# Patient Record
Sex: FEMALE | Race: WHITE | NOT HISPANIC OR LATINO | Employment: OTHER | URBAN - METROPOLITAN AREA
[De-identification: names, ages, dates, MRNs, and addresses within clinical notes are randomized per-mention and may not be internally consistent; named-entity substitution may affect disease eponyms.]

---

## 2018-03-01 ENCOUNTER — CONVERSION ENCOUNTER (OUTPATIENT)
Dept: URGENT CARE | Facility: CLINIC | Age: 48
End: 2018-03-01

## 2018-07-29 ENCOUNTER — HOSPITAL ENCOUNTER (OUTPATIENT)
Dept: URGENT CARE | Facility: CLINIC | Age: 48
Discharge: HOME | End: 2018-07-29
Attending: FAMILY MEDICINE | Admitting: FAMILY MEDICINE

## 2018-07-29 ENCOUNTER — CONVERSION ENCOUNTER (OUTPATIENT)
Dept: URGENT CARE | Facility: CLINIC | Age: 48
End: 2018-07-29

## 2019-06-04 VITALS
SYSTOLIC BLOOD PRESSURE: 102 MMHG | DIASTOLIC BLOOD PRESSURE: 68 MMHG | WEIGHT: 128.2 LBS | RESPIRATION RATE: 20 BRPM | SYSTOLIC BLOOD PRESSURE: 119 MMHG | BODY MASS INDEX: 22.37 KG/M2 | HEIGHT: 66 IN | WEIGHT: 139.2 LBS | BODY MASS INDEX: 20.69 KG/M2 | HEART RATE: 76 BPM | OXYGEN SATURATION: 100 % | DIASTOLIC BLOOD PRESSURE: 80 MMHG | OXYGEN SATURATION: 100 % | HEART RATE: 84 BPM

## 2021-06-15 ENCOUNTER — APPOINTMENT (OUTPATIENT)
Dept: PAIN MEDICINE | Facility: CLINIC | Age: 51
End: 2021-06-15

## 2021-07-08 ENCOUNTER — OFFICE (AMBULATORY)
Dept: URBAN - METROPOLITAN AREA CLINIC 64 | Facility: CLINIC | Age: 51
End: 2021-07-08

## 2021-07-08 VITALS
HEART RATE: 83 BPM | HEIGHT: 66 IN | WEIGHT: 148 LBS | SYSTOLIC BLOOD PRESSURE: 106 MMHG | DIASTOLIC BLOOD PRESSURE: 61 MMHG

## 2021-07-08 DIAGNOSIS — Z91.018 ALLERGY TO OTHER FOODS: ICD-10-CM

## 2021-07-08 DIAGNOSIS — R13.10 DYSPHAGIA, UNSPECIFIED: ICD-10-CM

## 2021-07-08 DIAGNOSIS — R19.4 CHANGE IN BOWEL HABIT: ICD-10-CM

## 2021-07-08 PROCEDURE — 99203 OFFICE O/P NEW LOW 30 MIN: CPT | Performed by: INTERNAL MEDICINE

## 2021-08-04 ENCOUNTER — OFFICE (AMBULATORY)
Dept: URBAN - METROPOLITAN AREA PATHOLOGY 4 | Facility: PATHOLOGY | Age: 51
End: 2021-08-04

## 2021-08-04 ENCOUNTER — ON CAMPUS - OUTPATIENT (AMBULATORY)
Dept: URBAN - METROPOLITAN AREA HOSPITAL 2 | Facility: HOSPITAL | Age: 51
End: 2021-08-04

## 2021-08-04 VITALS
SYSTOLIC BLOOD PRESSURE: 133 MMHG | TEMPERATURE: 97.8 F | WEIGHT: 136 LBS | DIASTOLIC BLOOD PRESSURE: 75 MMHG | SYSTOLIC BLOOD PRESSURE: 144 MMHG | RESPIRATION RATE: 17 BRPM | SYSTOLIC BLOOD PRESSURE: 123 MMHG | RESPIRATION RATE: 16 BRPM | SYSTOLIC BLOOD PRESSURE: 129 MMHG | HEART RATE: 87 BPM | DIASTOLIC BLOOD PRESSURE: 77 MMHG | HEART RATE: 74 BPM | OXYGEN SATURATION: 98 % | OXYGEN SATURATION: 100 % | DIASTOLIC BLOOD PRESSURE: 78 MMHG | HEIGHT: 66 IN | DIASTOLIC BLOOD PRESSURE: 76 MMHG | HEART RATE: 83 BPM | OXYGEN SATURATION: 99 % | DIASTOLIC BLOOD PRESSURE: 71 MMHG | SYSTOLIC BLOOD PRESSURE: 118 MMHG | HEART RATE: 81 BPM | DIASTOLIC BLOOD PRESSURE: 83 MMHG | HEART RATE: 78 BPM | DIASTOLIC BLOOD PRESSURE: 73 MMHG | SYSTOLIC BLOOD PRESSURE: 141 MMHG | HEART RATE: 88 BPM | RESPIRATION RATE: 18 BRPM | SYSTOLIC BLOOD PRESSURE: 126 MMHG | DIASTOLIC BLOOD PRESSURE: 88 MMHG | HEART RATE: 92 BPM | SYSTOLIC BLOOD PRESSURE: 113 MMHG | DIASTOLIC BLOOD PRESSURE: 81 MMHG

## 2021-08-04 DIAGNOSIS — K25.9 GASTRIC ULCER, UNSPECIFIED AS ACUTE OR CHRONIC, WITHOUT HEMO: ICD-10-CM

## 2021-08-04 DIAGNOSIS — K22.2 ESOPHAGEAL OBSTRUCTION: ICD-10-CM

## 2021-08-04 DIAGNOSIS — K31.89 OTHER DISEASES OF STOMACH AND DUODENUM: ICD-10-CM

## 2021-08-04 DIAGNOSIS — K20.0 EOSINOPHILIC ESOPHAGITIS: ICD-10-CM

## 2021-08-04 DIAGNOSIS — R13.10 DYSPHAGIA, UNSPECIFIED: ICD-10-CM

## 2021-08-04 DIAGNOSIS — K25.7 CHRONIC GASTRIC ULCER WITHOUT HEMORRHAGE OR PERFORATION: ICD-10-CM

## 2021-08-04 DIAGNOSIS — Z91.018 ALLERGY TO OTHER FOODS: ICD-10-CM

## 2021-08-04 DIAGNOSIS — R19.4 CHANGE IN BOWEL HABIT: ICD-10-CM

## 2021-08-04 PROBLEM — K22.8 OTHER SPECIFIED DISEASES OF ESOPHAGUS: Status: ACTIVE | Noted: 2021-08-04

## 2021-08-04 LAB
GI HISTOLOGY: A. SELECT: (no result)
GI HISTOLOGY: B. UNSPECIFIED: (no result)
GI HISTOLOGY: C. SELECT: (no result)
GI HISTOLOGY: PDF REPORT: (no result)

## 2021-08-04 PROCEDURE — 43239 EGD BIOPSY SINGLE/MULTIPLE: CPT | Performed by: INTERNAL MEDICINE

## 2021-08-04 PROCEDURE — 88342 IMHCHEM/IMCYTCHM 1ST ANTB: CPT | Mod: 26 | Performed by: INTERNAL MEDICINE

## 2021-08-04 PROCEDURE — 88305 TISSUE EXAM BY PATHOLOGIST: CPT | Mod: 26 | Performed by: INTERNAL MEDICINE

## 2021-08-04 PROCEDURE — 43450 DILATE ESOPHAGUS 1/MULT PASS: CPT | Performed by: INTERNAL MEDICINE

## 2021-08-04 PROCEDURE — 45378 DIAGNOSTIC COLONOSCOPY: CPT | Performed by: INTERNAL MEDICINE

## 2021-08-04 RX ORDER — PANTOPRAZOLE SODIUM 40 MG/1
TABLET, DELAYED RELEASE ORAL
Qty: 90 | Refills: 0 | Status: COMPLETED
End: 2023-10-26

## 2021-08-04 RX ORDER — MELOXICAM 15 MG/1
TABLET ORAL
Refills: 0 | Status: COMPLETED
End: 2021-08-04

## 2021-08-04 RX ORDER — ACETAMINOPHEN, ASPIRIN (NSAID) AND CAFFEINE 250; 250; 65 MG/1; MG/1; MG/1
TABLET, FILM COATED ORAL
Qty: 0 | Refills: 0 | Status: COMPLETED
End: 2021-08-04

## 2021-09-24 ENCOUNTER — OFFICE (AMBULATORY)
Dept: URBAN - METROPOLITAN AREA CLINIC 64 | Facility: CLINIC | Age: 51
End: 2021-09-24

## 2021-09-24 VITALS
SYSTOLIC BLOOD PRESSURE: 123 MMHG | HEART RATE: 86 BPM | DIASTOLIC BLOOD PRESSURE: 83 MMHG | HEIGHT: 66 IN | WEIGHT: 141 LBS

## 2021-09-24 DIAGNOSIS — K25.7 CHRONIC GASTRIC ULCER WITHOUT HEMORRHAGE OR PERFORATION: ICD-10-CM

## 2021-09-24 DIAGNOSIS — K59.00 CONSTIPATION, UNSPECIFIED: ICD-10-CM

## 2021-09-24 DIAGNOSIS — R13.10 DYSPHAGIA, UNSPECIFIED: ICD-10-CM

## 2021-09-24 DIAGNOSIS — Z91.018 ALLERGY TO OTHER FOODS: ICD-10-CM

## 2021-09-24 DIAGNOSIS — K20.0 EOSINOPHILIC ESOPHAGITIS: ICD-10-CM

## 2021-09-24 PROCEDURE — 99214 OFFICE O/P EST MOD 30 MIN: CPT | Performed by: INTERNAL MEDICINE

## 2021-09-24 RX ORDER — FLUTICASONE PROPIONATE 220 UG/1
AEROSOL, METERED RESPIRATORY (INHALATION)
Qty: 1 | Refills: 3 | Status: ACTIVE
Start: 2021-09-24

## 2021-09-28 ENCOUNTER — TREATMENT (OUTPATIENT)
Dept: PHYSICAL THERAPY | Facility: CLINIC | Age: 51
End: 2021-09-28

## 2021-09-28 DIAGNOSIS — S93.401A SPRAIN OF RIGHT ANKLE, UNSPECIFIED LIGAMENT, INITIAL ENCOUNTER: ICD-10-CM

## 2021-09-28 DIAGNOSIS — S73.101A SPRAIN OF RIGHT HIP, INITIAL ENCOUNTER: ICD-10-CM

## 2021-09-28 DIAGNOSIS — S33.5XXA LUMBAR SPRAIN, INITIAL ENCOUNTER: Primary | ICD-10-CM

## 2021-09-28 DIAGNOSIS — S83.91XA SPRAIN OF RIGHT KNEE, UNSPECIFIED LIGAMENT, INITIAL ENCOUNTER: ICD-10-CM
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
## 2021-09-28 PROCEDURE — 97162 PT EVAL MOD COMPLEX 30 MIN: CPT | Performed by: PHYSICAL THERAPIST

## 2021-09-28 PROCEDURE — 97110 THERAPEUTIC EXERCISES: CPT | Performed by: PHYSICAL THERAPIST
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
## 2021-09-30 ENCOUNTER — TREATMENT (OUTPATIENT)
Dept: PHYSICAL THERAPY | Facility: CLINIC | Age: 51
End: 2021-09-30
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
## 2021-09-30 DIAGNOSIS — S93.401A SPRAIN OF RIGHT ANKLE, UNSPECIFIED LIGAMENT, INITIAL ENCOUNTER: ICD-10-CM

## 2021-09-30 DIAGNOSIS — S33.5XXA LUMBAR SPRAIN, INITIAL ENCOUNTER: Primary | ICD-10-CM

## 2021-09-30 DIAGNOSIS — S73.101A SPRAIN OF RIGHT HIP, INITIAL ENCOUNTER: ICD-10-CM

## 2021-09-30 DIAGNOSIS — S83.91XA SPRAIN OF RIGHT KNEE, UNSPECIFIED LIGAMENT, INITIAL ENCOUNTER: ICD-10-CM

## 2021-09-30 PROCEDURE — 97113 AQUATIC THERAPY/EXERCISES: CPT | Performed by: PHYSICAL THERAPIST
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
## 2021-10-07 ENCOUNTER — TREATMENT (OUTPATIENT)
Dept: PHYSICAL THERAPY | Facility: CLINIC | Age: 51
End: 2021-10-07

## 2021-10-07 DIAGNOSIS — S93.401A SPRAIN OF RIGHT ANKLE, UNSPECIFIED LIGAMENT, INITIAL ENCOUNTER: ICD-10-CM

## 2021-10-07 DIAGNOSIS — S33.5XXA LUMBAR SPRAIN, INITIAL ENCOUNTER: Primary | ICD-10-CM

## 2021-10-07 DIAGNOSIS — S83.91XA SPRAIN OF RIGHT KNEE, UNSPECIFIED LIGAMENT, INITIAL ENCOUNTER: ICD-10-CM

## 2021-10-07 DIAGNOSIS — S73.101A SPRAIN OF RIGHT HIP, INITIAL ENCOUNTER: ICD-10-CM

## 2021-10-07 PROCEDURE — 97113 AQUATIC THERAPY/EXERCISES: CPT | Performed by: PHYSICAL THERAPIST

## 2021-10-07 NOTE — PROGRESS NOTES
Physical Therapy Daily Progress Note  VISIT#: 3 POC 12   Diagnosis Plan   1. Lumbar sprain, initial encounter     2. Sprain of right hip, initial encounter     3. Sprain of right knee, unspecified ligament, initial encounter     4. Sprain of right ankle, unspecified ligament, initial encounter       Subjective     Archana Cisneros reports: just got up.  R LBP, R medial knee and back of R heel Achilles tendon sore 6/10. Was more sore after last PT session, later that day. Was surprised how sore she got. She did not feel she did that much.       Objective       See Exercise, Manual, and Modality Logs for complete treatment.     Patient Education: [employer] ex program  Rest and re-hydrate after pool: repeated.    In/out with steps.      Assessment/Plan    C/o increased Low back  & R ankle worse after session.   C/o still having trouble rolling over in bed.    STG's (2 - 4 weeks)  1. Tolerate 45 minutes aquatic therapy with reduction in pain - making progress  2. Able to ambulate x 10 min on Aquatic TM with improved gait pattern - making progress.  3. Reports improved ability to walk through the grocery store.  4. Decreased swelling in R lateral ankle and knee.    LTG's (by d/c)  1. Independent with HEP and able to manage condition independently.  2. Patient  has been able to wean out of R ankle brace.  3. Decrease overall pain 70% or more   4. Significant improvement on outcome measure.    Progress per Plan of Care and Progress strengthening /stabilization /functional activity  When able to tolerate.          Timed:         Manual Therapy:         mins  66677;     Therapeutic Exercise:         mins  27758;     Neuromuscular Funmi:        mins  99761;    Therapeutic Activity:          mins  36809;     Gait Training:           mins  25216;     Ultrasound:          mins  45388;    Ionto                                   mins   30840  Self Care                            mins   58998  [facility]                   mins   4209  Aquatic                          45     mins 08755    Un-Timed:  Electrical Stimulation:         mins  53578 ( );  Dry Needling          mins self-pay  Traction          mins 11912    Timed Treatment:   45   mins   Total Treatment:     45   mins    Eleni Francis PT  [place] License # [id number]  Physical Therapist

## 2021-10-11 ENCOUNTER — TREATMENT (OUTPATIENT)
Dept: PHYSICAL THERAPY | Facility: CLINIC | Age: 51
End: 2021-10-11

## 2021-10-11 DIAGNOSIS — S73.101A SPRAIN OF RIGHT HIP, INITIAL ENCOUNTER: ICD-10-CM

## 2021-10-11 DIAGNOSIS — S93.401A SPRAIN OF RIGHT ANKLE, UNSPECIFIED LIGAMENT, INITIAL ENCOUNTER: ICD-10-CM
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
## 2021-10-11 DIAGNOSIS — S83.91XA SPRAIN OF RIGHT KNEE, UNSPECIFIED LIGAMENT, INITIAL ENCOUNTER: ICD-10-CM

## 2021-10-11 DIAGNOSIS — S33.5XXA LUMBAR SPRAIN, INITIAL ENCOUNTER: Primary | ICD-10-CM

## 2021-10-11 PROCEDURE — 97113 AQUATIC THERAPY/EXERCISES: CPT | Performed by: PHYSICAL THERAPIST
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
## 2021-10-20 ENCOUNTER — TREATMENT (OUTPATIENT)
Dept: PHYSICAL THERAPY | Facility: CLINIC | Age: 51
End: 2021-10-20

## 2021-10-20 DIAGNOSIS — S83.91XA SPRAIN OF RIGHT KNEE, UNSPECIFIED LIGAMENT, INITIAL ENCOUNTER: ICD-10-CM

## 2021-10-20 DIAGNOSIS — S73.101A SPRAIN OF RIGHT HIP, INITIAL ENCOUNTER: ICD-10-CM

## 2021-10-20 DIAGNOSIS — S33.5XXA LUMBAR SPRAIN, INITIAL ENCOUNTER: Primary | ICD-10-CM

## 2021-10-20 DIAGNOSIS — S93.401A SPRAIN OF RIGHT ANKLE, UNSPECIFIED LIGAMENT, INITIAL ENCOUNTER: ICD-10-CM

## 2021-10-20 PROCEDURE — 97113 AQUATIC THERAPY/EXERCISES: CPT | Performed by: PHYSICAL THERAPIST

## 2021-10-20 NOTE — PROGRESS NOTES
Physical Therapy Daily Progress Note  VISIT#: 5 POC 12   Diagnosis Plan   1. Lumbar sprain, initial encounter     2. Sprain of right hip, initial encounter     3. Sprain of right knee, unspecified ligament, initial encounter     4. Sprain of right ankle, unspecified ligament, initial encounter       Subjective     Archana Cisneros reports: R LBP 6/10, R  Ankle pain 8/10. Last night Ankle was killing her. Used Epsom salt in warm water, it helped a little.     Objective       See Exercise, Manual, and Modality Logs for complete treatment.     Patient Education: [employer] ex program  Discussed: stressed due to work on her home.  PT demo how to do morning stretches in am: start with arms, then legs followed by sides and all 4's stretch.      LBP sharper during hang to stand from suspension.  Still mainly R back above heel pain and not normal sensation bottom of R medial foot.   EIS back over rail worked better.  Felt better to do Ham curls while putting pressure over R SI during.    Pace on the TM still aggravating foot, going back hurt R LB more during.    Did noodle suspension during jets  In the beginning and at the end    Did rinse off after pool.    In/out with steps.      Assessment/Plan  Still no significant progress.  But felt better during hang time in the jets.  Discussed trying lumbar brace and trial of Tape to ankle.  Did after pool. Both seemed to help. Left with R ankle tape on.      STG's (2 - 4 weeks)  1. Tolerate 45 minutes aquatic therapy with reduction in pain - making progress  2. Able to ambulate x 10 min on Aquatic TM with improved gait pattern - making progress.  3. Reports improved ability to walk through the grocery store - not yet  4. Decreased swelling in R lateral ankle and knee - still swelling mainly in lateral R ankle.    LTG's (by d/c)  1. Independent with HEP and able to manage condition independently.  2. Patient  has been able to wean out of R ankle brace.  3. Decrease overall pain 70% or  more   4. Significant improvement on outcome measure.    Progress per Plan of Care and Progress strengthening /stabilization /functional activity  When able to tolerate.          Timed:         Manual Therapy:         mins  82223;     Therapeutic Exercise:         mins  96018;     Neuromuscular Funmi:        mins  83991;    Therapeutic Activity:          mins  31692;     Gait Training:           mins  06826;     Ultrasound:          mins  43525;    Ionto                                   mins   09338  Self Care                            mins   18208  Canalith Repos                   mins  4209  Aquatic                          45     mins 36657    Un-Timed:  Electrical Stimulation:         mins  41067 ( );  Dry Needling          mins self-pay  Traction          mins 78658    Timed Treatment:   45   mins   Total Treatment:     45   mins    Eleni Francis PT  [place] License # [id number]  Physical Therapist

## 2021-10-22 ENCOUNTER — TREATMENT (OUTPATIENT)
Dept: PHYSICAL THERAPY | Facility: CLINIC | Age: 51
End: 2021-10-22

## 2021-10-22 DIAGNOSIS — S73.101A SPRAIN OF RIGHT HIP, INITIAL ENCOUNTER: ICD-10-CM

## 2021-10-22 DIAGNOSIS — S33.5XXA LUMBAR SPRAIN, INITIAL ENCOUNTER: Primary | ICD-10-CM
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
## 2021-10-22 DIAGNOSIS — S83.91XA SPRAIN OF RIGHT KNEE, UNSPECIFIED LIGAMENT, INITIAL ENCOUNTER: ICD-10-CM

## 2021-10-22 DIAGNOSIS — S93.401A SPRAIN OF RIGHT ANKLE, UNSPECIFIED LIGAMENT, INITIAL ENCOUNTER: ICD-10-CM

## 2021-10-22 PROCEDURE — 97113 AQUATIC THERAPY/EXERCISES: CPT | Performed by: PHYSICAL THERAPIST
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
## 2021-10-25 ENCOUNTER — TREATMENT (OUTPATIENT)
Dept: PHYSICAL THERAPY | Facility: CLINIC | Age: 51
End: 2021-10-25

## 2021-10-25 DIAGNOSIS — S73.101A SPRAIN OF RIGHT HIP, INITIAL ENCOUNTER: ICD-10-CM

## 2021-10-25 DIAGNOSIS — S93.401A SPRAIN OF RIGHT ANKLE, UNSPECIFIED LIGAMENT, INITIAL ENCOUNTER: ICD-10-CM

## 2021-10-25 DIAGNOSIS — S83.91XA SPRAIN OF RIGHT KNEE, UNSPECIFIED LIGAMENT, INITIAL ENCOUNTER: ICD-10-CM

## 2021-10-25 DIAGNOSIS — S33.5XXA LUMBAR SPRAIN, INITIAL ENCOUNTER: Primary | ICD-10-CM
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
## 2021-10-25 PROCEDURE — 97113 AQUATIC THERAPY/EXERCISES: CPT | Performed by: PHYSICAL THERAPIST
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
## 2021-10-27 ENCOUNTER — OFFICE (AMBULATORY)
Dept: URBAN - METROPOLITAN AREA PATHOLOGY 4 | Facility: PATHOLOGY | Age: 51
End: 2021-10-27

## 2021-10-27 ENCOUNTER — ON CAMPUS - OUTPATIENT (AMBULATORY)
Dept: URBAN - METROPOLITAN AREA HOSPITAL 2 | Facility: HOSPITAL | Age: 51
End: 2021-10-27

## 2021-10-27 VITALS
DIASTOLIC BLOOD PRESSURE: 74 MMHG | SYSTOLIC BLOOD PRESSURE: 106 MMHG | DIASTOLIC BLOOD PRESSURE: 70 MMHG | SYSTOLIC BLOOD PRESSURE: 120 MMHG | TEMPERATURE: 97.6 F | DIASTOLIC BLOOD PRESSURE: 81 MMHG | RESPIRATION RATE: 16 BRPM | SYSTOLIC BLOOD PRESSURE: 108 MMHG | WEIGHT: 142.4 LBS | HEART RATE: 76 BPM | HEART RATE: 88 BPM | RESPIRATION RATE: 18 BRPM | OXYGEN SATURATION: 98 % | HEART RATE: 75 BPM | SYSTOLIC BLOOD PRESSURE: 134 MMHG | HEART RATE: 70 BPM | HEIGHT: 66 IN | HEART RATE: 81 BPM | DIASTOLIC BLOOD PRESSURE: 92 MMHG | OXYGEN SATURATION: 100 % | DIASTOLIC BLOOD PRESSURE: 50 MMHG | RESPIRATION RATE: 17 BRPM | DIASTOLIC BLOOD PRESSURE: 68 MMHG | HEART RATE: 91 BPM | SYSTOLIC BLOOD PRESSURE: 113 MMHG | SYSTOLIC BLOOD PRESSURE: 114 MMHG

## 2021-10-27 DIAGNOSIS — K22.89 OTHER SPECIFIED DISEASE OF ESOPHAGUS: ICD-10-CM

## 2021-10-27 DIAGNOSIS — K22.2 ESOPHAGEAL OBSTRUCTION: ICD-10-CM

## 2021-10-27 DIAGNOSIS — K20.0 EOSINOPHILIC ESOPHAGITIS: ICD-10-CM

## 2021-10-27 DIAGNOSIS — R13.10 DYSPHAGIA, UNSPECIFIED: ICD-10-CM

## 2021-10-27 DIAGNOSIS — Z87.11 PERSONAL HISTORY OF PEPTIC ULCER DISEASE: ICD-10-CM

## 2021-10-27 LAB
GI HISTOLOGY: A. UNSPECIFIED: (no result)
GI HISTOLOGY: PDF REPORT: (no result)

## 2021-10-27 PROCEDURE — 43450 DILATE ESOPHAGUS 1/MULT PASS: CPT | Performed by: INTERNAL MEDICINE

## 2021-10-27 PROCEDURE — 43239 EGD BIOPSY SINGLE/MULTIPLE: CPT | Performed by: INTERNAL MEDICINE

## 2021-10-27 PROCEDURE — 88305 TISSUE EXAM BY PATHOLOGIST: CPT | Mod: 26,Q6 | Performed by: INTERNAL MEDICINE

## 2021-11-01 ENCOUNTER — TREATMENT (OUTPATIENT)
Dept: PHYSICAL THERAPY | Facility: CLINIC | Age: 51
End: 2021-11-01

## 2021-11-01 DIAGNOSIS — S93.401A SPRAIN OF RIGHT ANKLE, UNSPECIFIED LIGAMENT, INITIAL ENCOUNTER: ICD-10-CM
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
## 2021-11-01 DIAGNOSIS — S83.91XA SPRAIN OF RIGHT KNEE, UNSPECIFIED LIGAMENT, INITIAL ENCOUNTER: ICD-10-CM

## 2021-11-01 DIAGNOSIS — S73.101A SPRAIN OF RIGHT HIP, INITIAL ENCOUNTER: ICD-10-CM

## 2021-11-01 DIAGNOSIS — S33.5XXA LUMBAR SPRAIN, INITIAL ENCOUNTER: Primary | ICD-10-CM

## 2021-11-01 PROCEDURE — 97113 AQUATIC THERAPY/EXERCISES: CPT | Performed by: PHYSICAL THERAPIST
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
## 2021-11-04 ENCOUNTER — TREATMENT (OUTPATIENT)
Dept: PHYSICAL THERAPY | Facility: CLINIC | Age: 51
End: 2021-11-04

## 2021-11-04 DIAGNOSIS — S73.101A SPRAIN OF RIGHT HIP, INITIAL ENCOUNTER: ICD-10-CM

## 2021-11-04 DIAGNOSIS — S83.91XA SPRAIN OF RIGHT KNEE, UNSPECIFIED LIGAMENT, INITIAL ENCOUNTER: ICD-10-CM

## 2021-11-04 DIAGNOSIS — S93.401A SPRAIN OF RIGHT ANKLE, UNSPECIFIED LIGAMENT, INITIAL ENCOUNTER: ICD-10-CM

## 2021-11-04 DIAGNOSIS — S33.5XXA LUMBAR SPRAIN, INITIAL ENCOUNTER: Primary | ICD-10-CM
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
## 2021-11-04 PROCEDURE — 97113 AQUATIC THERAPY/EXERCISES: CPT | Performed by: PHYSICAL THERAPIST
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
## 2021-11-10 ENCOUNTER — TREATMENT (OUTPATIENT)
Dept: PHYSICAL THERAPY | Facility: CLINIC | Age: 51
End: 2021-11-10

## 2021-11-10 DIAGNOSIS — S73.101A SPRAIN OF RIGHT HIP, INITIAL ENCOUNTER: ICD-10-CM

## 2021-11-10 DIAGNOSIS — S83.91XA SPRAIN OF RIGHT KNEE, UNSPECIFIED LIGAMENT, INITIAL ENCOUNTER: ICD-10-CM

## 2021-11-10 DIAGNOSIS — S33.5XXA LUMBAR SPRAIN, INITIAL ENCOUNTER: Primary | ICD-10-CM

## 2021-11-10 DIAGNOSIS — S93.401A SPRAIN OF RIGHT ANKLE, UNSPECIFIED LIGAMENT, INITIAL ENCOUNTER: ICD-10-CM

## 2021-11-10 PROCEDURE — 97110 THERAPEUTIC EXERCISES: CPT | Performed by: PHYSICAL THERAPIST

## 2021-11-10 PROCEDURE — 97014 ELECTRIC STIMULATION THERAPY: CPT | Performed by: PHYSICAL THERAPIST
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
## 2021-11-15 ENCOUNTER — TREATMENT (OUTPATIENT)
Dept: PHYSICAL THERAPY | Facility: CLINIC | Age: 51
End: 2021-11-15

## 2021-11-15 DIAGNOSIS — S73.101A SPRAIN OF RIGHT HIP, INITIAL ENCOUNTER: ICD-10-CM

## 2021-11-15 DIAGNOSIS — S83.91XA SPRAIN OF RIGHT KNEE, UNSPECIFIED LIGAMENT, INITIAL ENCOUNTER: ICD-10-CM
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
## 2021-11-15 DIAGNOSIS — S93.401A SPRAIN OF RIGHT ANKLE, UNSPECIFIED LIGAMENT, INITIAL ENCOUNTER: ICD-10-CM

## 2021-11-15 DIAGNOSIS — S33.5XXA LUMBAR SPRAIN, INITIAL ENCOUNTER: Primary | ICD-10-CM

## 2021-11-15 PROCEDURE — 97110 THERAPEUTIC EXERCISES: CPT | Performed by: PHYSICAL THERAPIST

## 2021-11-15 PROCEDURE — 97140 MANUAL THERAPY 1/> REGIONS: CPT | Performed by: PHYSICAL THERAPIST

## 2021-11-15 PROCEDURE — 97014 ELECTRIC STIMULATION THERAPY: CPT | Performed by: PHYSICAL THERAPIST
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
## 2021-12-08 ENCOUNTER — DOCUMENTATION (OUTPATIENT)
Dept: PHYSICAL THERAPY | Facility: CLINIC | Age: 51
End: 2021-12-08

## 2021-12-08 NOTE — PROGRESS NOTES
Physical Therapy Discharge Summary          Patient: Archana Cisneros  : 1970  Diagnosis/ICD-10 Code: Lumbar sprain, R hip , knee and ankle pain.  Referring practitioner: Temo Velazquez MS.  Date of Initial Visit: 21  Today's Date: 2021  Patient seen for 11 sessions      Visit Diagnosis:  LBP, R hip, knee and ankle pain.    Discharge Status of Patient: See MD Note dated: 11/10/21    Goals: Partially Met    Discharge Plan: Continue with current home exercise program as instructed    Comments:    LM/VM FOR PT TO CALL BACK AND LET US KNOW IF SHE WOULD LIKE TO SCHEDULE OR BE D/C'D. IF WE DO NOT HEAR BACK FROM HER BY 12-3, WE WILL D/C' PT AT THAT TIME  Patient did not call back to schedule more.   If she wants to resume PT she will need a new MD order.    Discharge date: 21        Eleni Francis PT  [place] License # [id number]  Physical Therapist

## 2022-02-21 ENCOUNTER — OFFICE (AMBULATORY)
Dept: URBAN - METROPOLITAN AREA CLINIC 64 | Facility: CLINIC | Age: 52
End: 2022-02-21

## 2022-02-21 VITALS
DIASTOLIC BLOOD PRESSURE: 65 MMHG | SYSTOLIC BLOOD PRESSURE: 102 MMHG | WEIGHT: 145 LBS | HEART RATE: 89 BPM | HEIGHT: 66 IN

## 2022-02-21 DIAGNOSIS — K20.0 EOSINOPHILIC ESOPHAGITIS: ICD-10-CM

## 2022-02-21 DIAGNOSIS — K59.00 CONSTIPATION, UNSPECIFIED: ICD-10-CM

## 2022-02-21 DIAGNOSIS — R13.10 DYSPHAGIA, UNSPECIFIED: ICD-10-CM

## 2022-02-21 PROCEDURE — 99213 OFFICE O/P EST LOW 20 MIN: CPT | Performed by: NURSE PRACTITIONER

## 2022-05-04 ENCOUNTER — OFFICE (AMBULATORY)
Dept: URBAN - METROPOLITAN AREA CLINIC 64 | Facility: CLINIC | Age: 52
End: 2022-05-04

## 2022-05-04 VITALS
DIASTOLIC BLOOD PRESSURE: 92 MMHG | WEIGHT: 142 LBS | HEART RATE: 81 BPM | SYSTOLIC BLOOD PRESSURE: 132 MMHG | HEIGHT: 66 IN

## 2022-05-04 DIAGNOSIS — K20.0 EOSINOPHILIC ESOPHAGITIS: ICD-10-CM

## 2022-05-04 PROCEDURE — 99214 OFFICE O/P EST MOD 30 MIN: CPT | Performed by: NURSE PRACTITIONER

## 2022-10-20 ENCOUNTER — TRANSCRIBE ORDERS (OUTPATIENT)
Dept: PHYSICAL THERAPY | Facility: CLINIC | Age: 52
End: 2022-10-20

## 2022-10-20 DIAGNOSIS — M54.2 CERVICAL PAIN: Primary | ICD-10-CM

## 2022-11-21 ENCOUNTER — TREATMENT (OUTPATIENT)
Dept: PHYSICAL THERAPY | Facility: CLINIC | Age: 52
End: 2022-11-21

## 2022-11-21 ENCOUNTER — TRANSCRIBE ORDERS (OUTPATIENT)
Dept: PHYSICAL THERAPY | Facility: CLINIC | Age: 52
End: 2022-11-21

## 2022-11-21 DIAGNOSIS — M54.50 LOW BACK PAIN, UNSPECIFIED BACK PAIN LATERALITY, UNSPECIFIED CHRONICITY, UNSPECIFIED WHETHER SCIATICA PRESENT: Primary | ICD-10-CM

## 2022-11-21 DIAGNOSIS — M54.50 CHRONIC LOW BACK PAIN, UNSPECIFIED BACK PAIN LATERALITY, UNSPECIFIED WHETHER SCIATICA PRESENT: Primary | ICD-10-CM

## 2022-11-21 DIAGNOSIS — G89.29 CHRONIC LOW BACK PAIN, UNSPECIFIED BACK PAIN LATERALITY, UNSPECIFIED WHETHER SCIATICA PRESENT: Primary | ICD-10-CM

## 2022-11-21 PROCEDURE — 97162 PT EVAL MOD COMPLEX 30 MIN: CPT | Performed by: PHYSICAL THERAPIST

## 2022-11-21 PROCEDURE — 97110 THERAPEUTIC EXERCISES: CPT | Performed by: PHYSICAL THERAPIST
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
## 2022-12-12 ENCOUNTER — TREATMENT (OUTPATIENT)
Dept: PHYSICAL THERAPY | Facility: CLINIC | Age: 52
End: 2022-12-12

## 2022-12-12 DIAGNOSIS — M54.50 CHRONIC LOW BACK PAIN, UNSPECIFIED BACK PAIN LATERALITY, UNSPECIFIED WHETHER SCIATICA PRESENT: Primary | ICD-10-CM

## 2022-12-12 DIAGNOSIS — S33.5XXA LUMBAR SPRAIN, INITIAL ENCOUNTER: ICD-10-CM

## 2022-12-12 DIAGNOSIS — S73.101A SPRAIN OF RIGHT HIP, INITIAL ENCOUNTER: ICD-10-CM

## 2022-12-12 DIAGNOSIS — G89.29 CHRONIC LOW BACK PAIN, UNSPECIFIED BACK PAIN LATERALITY, UNSPECIFIED WHETHER SCIATICA PRESENT: Primary | ICD-10-CM

## 2022-12-12 PROCEDURE — 97112 NEUROMUSCULAR REEDUCATION: CPT | Performed by: PHYSICAL THERAPIST

## 2022-12-12 PROCEDURE — 97110 THERAPEUTIC EXERCISES: CPT | Performed by: PHYSICAL THERAPIST

## 2022-12-12 NOTE — PROGRESS NOTES
Physical Therapy Daily Treatment Note    [facility], [address]  [place]  [address] [phone]      Patient: Archana Cisneros   : 1970  Diagnosis/ICD-10 Code:  Chronic low back pain, unspecified back pain laterality, unspecified whether sciatica present [M54.50, G89.29]  Referring practitioner: Maximus Bain MD  Date of Initial Visit: 2022  Today's Date: 2022  Patient seen for 2 sessions .  POC 2x/wk x 12 weeks, exp 23  Insurance , exp 23      Precautions: angina, anxiety, arthritis, asthma, depression, headaches, neck injury, osteopenia, SOA, UTI      VISIT#: 2      Subjective  Pain 5/10 center low back.  She did fine after evaluation. She has not started HEP yet.       Objective     See Exercise, Manual, and Modality Logs for complete treatment.       Patient Education:  Pt. Education on towel roll/lumbar support when sitting. HEP progressed and issued.     Exercises  Supine Posterior Pelvic Tilt - 2 x daily - 7 x weekly - 2 sets - 10 reps - 5 hold  Supine Hip Adduction Isometric with Ball - 2 x daily - 7 x weekly - 2 sets - 10 reps - 5 hold  Hooklying Isometric Hip Abduction with Belt - 2 x daily - 7 x weekly - 2 sets - 10 reps - 5 hold      Assessment/Plan:  Pt. Has discomfort throughout session with almost every ex/activity.  Cueing to perform all correctly. No increase in pain after treatment.       Progress per Plan of Care:  Monitor response.             Timed:         Manual Therapy:         mins  58770;     Therapeutic Exercise:    30     mins  13375;     Neuromuscular Funmi:   10     mins  31531;    Therapeutic Activity:          mins  79876;     Gait Training:           mins  32496;     Ultrasound:          mins  16691;    Ionto                                   mins   70113  Self Care                            mins   66469  Aquatic                               mins 27371    Un-Timed:  Electrical Stimulation:         mins  84716 (  );  Traction          mins 49145      Timed Treatment: 40     mins   Total Treatment:   40    mins    Jing Blanco PTA  Physical Therapist Assistant   [place] license:  #[id number]

## 2022-12-14 ENCOUNTER — TREATMENT (OUTPATIENT)
Dept: PHYSICAL THERAPY | Facility: CLINIC | Age: 52
End: 2022-12-14

## 2022-12-14 DIAGNOSIS — G89.29 CHRONIC LOW BACK PAIN, UNSPECIFIED BACK PAIN LATERALITY, UNSPECIFIED WHETHER SCIATICA PRESENT: Primary | ICD-10-CM

## 2022-12-14 DIAGNOSIS — S33.5XXA LUMBAR SPRAIN, INITIAL ENCOUNTER: ICD-10-CM

## 2022-12-14 DIAGNOSIS — M54.50 CHRONIC LOW BACK PAIN, UNSPECIFIED BACK PAIN LATERALITY, UNSPECIFIED WHETHER SCIATICA PRESENT: Primary | ICD-10-CM

## 2022-12-14 PROCEDURE — 97112 NEUROMUSCULAR REEDUCATION: CPT | Performed by: PHYSICAL THERAPIST

## 2022-12-14 PROCEDURE — 97110 THERAPEUTIC EXERCISES: CPT | Performed by: PHYSICAL THERAPIST

## 2022-12-21 ENCOUNTER — TREATMENT (OUTPATIENT)
Dept: PHYSICAL THERAPY | Facility: CLINIC | Age: 52
End: 2022-12-21

## 2022-12-21 DIAGNOSIS — M54.50 CHRONIC LOW BACK PAIN, UNSPECIFIED BACK PAIN LATERALITY, UNSPECIFIED WHETHER SCIATICA PRESENT: Primary | ICD-10-CM

## 2022-12-21 DIAGNOSIS — S33.5XXA LUMBAR SPRAIN, INITIAL ENCOUNTER: ICD-10-CM

## 2022-12-21 DIAGNOSIS — G89.29 CHRONIC LOW BACK PAIN, UNSPECIFIED BACK PAIN LATERALITY, UNSPECIFIED WHETHER SCIATICA PRESENT: Primary | ICD-10-CM

## 2022-12-21 PROCEDURE — 97110 THERAPEUTIC EXERCISES: CPT | Performed by: PHYSICAL THERAPIST

## 2022-12-21 PROCEDURE — 97140 MANUAL THERAPY 1/> REGIONS: CPT | Performed by: PHYSICAL THERAPIST

## 2022-12-21 PROCEDURE — 97112 NEUROMUSCULAR REEDUCATION: CPT | Performed by: PHYSICAL THERAPIST

## 2022-12-21 NOTE — PROGRESS NOTES
Physical Therapy Progress  Note/30-Day Note    Patient: Archana Cisneros   : 1970  Referring practitioner: Maximus Bain MD  Date of Initial Visit: Type: THERAPY  Noted: 2022  Today's Date: 2022    VISIT#: 4          Subjective Evaluation    Pain  Current pain ratin  At best pain ratin  At worst pain rating: 10         Archana Cisneros reports: 6/10 pain level today in her right side of her lower back with increased sharp pain when returning to neutral from bending.     Objective        Special Questions  Patient is experiencing night pain, disturbed sleep, dizziness, headaches, nausea/motion sickness and ulcer problem.     Additional Special Questions  Per pt report; bladder surgery 2010 (prolapsed)      Postural Observations  Seated posture: good  Standing posture: good  Correction of posture: has no consistent effect    Additional Postural Observation Details  Slumped shoulders B and forward head position and decreased lumbar lordosis     Palpation     Additional Palpation Details  ttp and hypertonic in B piriformis    Tenderness     Left Hip   No tenderness in the PSIS.     Right Hip   Tenderness in the PSIS.     Neurological Testing     Sensation     Lumbar   Left   Intact: light touch    Comments   Right light touch: L2 hypersensative and diminished L3    Active Range of Motion     Lumbar   Flexion: WFL  Extension: WFL  Left lateral flexion: WFL  Right lateral flexion: WFL  Left rotation: WFL  Right rotation: WFL    Additional Active Range of Motion Details  Pain on right PSIS with lateral flexion to the right and with flexion forward    Passive Range of Motion     Additional Passive Range of Motion Details  B hips WFL in flexion, IR and external rotation    Strength/Myotome Testing     Left Hip   Planes of Motion   Flexion: 5  Extension: 4  Abduction: 4+    Right Hip   Planes of Motion   Flexion: 5 (pain in LB)  Extension: 4  Abduction: 5    Tests     Left Hip   Mark:  Negative.   90/90 SLR: Negative.     Right Hip   Mark: Positive.   90/90 SLR: Negative.    Additional Tests Details  Tight R hip flexor in Mark test position with knee extension and hip flexion off of mat and hip external roation R  This tightness decreased after stretches and STM      See Exercise, Manual, and Modality Logs for complete treatment.       Assessment & Plan     Assessment    Assessment details: Pt has increased tightness in the right hip flexor compared to the left and had increased flexibility after manual and stretching today.  Active release added with improved movement quality noted.  Decreased soft tissue tightness and increased flexibility post treatment.  SI alignment checked with slight right anterior rotation noted and this was leveled after stretching and manual therapy.  GEMA = 28/50 = 56% limited today (was 74% limited at eval).    Goals  Plan Goals: 1. The patient complains of low back pain.  LTG 1: 12 weeks:  The patient will report a pain rating of 3/10 or better in order to improve tolerance to activities of daily living and improve sleep quality.  STG 1a: 4 weeks:  The patient will report a pain rating of 5/10 or better.  Met for best pain level, but not current or worst levels    2. The patient demonstrates weakness of the R hip.  LTG 2: 12 weeks:  The patient will demonstrate 5 /5 strength for R hip flexion, abduction, and extension in order to improve hip stability.  STG 2a: 4 weeks:  The patient will demonstrate 4+ /5 strength for R hip flexion, abduction,  and extension.MET for some    3. The patient has gait dysfunction.  LTG 3: 12 weeks:  The patient will ambulate without assistive device, independently, for   community distances with minimal limp to the R lower extremity in order to improve mobility and allow   patient to perform activities such as grocery shopping with greater ease.  NOT MET  STG 3a: I with HEP  MET    4. Mobility: Walking/Moving Around Functional  Limitation    LTG 4: 12 weeks:  The patient will demonstrate 40 % limitation by achieving a score of 20 on the GEMA.  STG 4 a: 4 weeks:  The patient will demonstrate 60 % limitation by achieving a score of 30 on the GEMA.     Plan  Therapy options: will be seen for skilled therapy services  Treatment plan discussed with: patient          Progress per Plan of Care and Progress strengthening /stabilization /functional activity           Timed:  Manual Therapy:    15     mins  18231;  Therapeutic Exercise:    15     mins  47920;     Neuromuscular Funmi:    10    mins  77552;    Therapeutic Activity:          mins  52178;     Gait Training:           mins  08815;     Ultrasound:          mins  57976;    Electrical Stimulation:         mins  23409 ( );    Untimed:  Electrical Stimulation:         mins  73729 ( );  Mechanical Traction:         mins  20733;   Dry needling:       Self pay    Timed Treatment:   40   mins   Total Treatment:     40   mins  Alicia Simon PT, DPT, CLT, NUBIAN  Physical Therapist

## 2023-01-05 ENCOUNTER — TREATMENT (OUTPATIENT)
Dept: PHYSICAL THERAPY | Facility: CLINIC | Age: 53
End: 2023-01-05
Payer: MEDICARE

## 2023-01-05 DIAGNOSIS — M54.50 CHRONIC LOW BACK PAIN, UNSPECIFIED BACK PAIN LATERALITY, UNSPECIFIED WHETHER SCIATICA PRESENT: Primary | ICD-10-CM

## 2023-01-05 DIAGNOSIS — S33.5XXA LUMBAR SPRAIN, INITIAL ENCOUNTER: ICD-10-CM

## 2023-01-05 DIAGNOSIS — G89.29 CHRONIC LOW BACK PAIN, UNSPECIFIED BACK PAIN LATERALITY, UNSPECIFIED WHETHER SCIATICA PRESENT: Primary | ICD-10-CM

## 2023-01-05 PROCEDURE — 97110 THERAPEUTIC EXERCISES: CPT | Performed by: PHYSICAL THERAPIST

## 2023-01-05 PROCEDURE — 97112 NEUROMUSCULAR REEDUCATION: CPT | Performed by: PHYSICAL THERAPIST

## 2023-01-05 PROCEDURE — 97140 MANUAL THERAPY 1/> REGIONS: CPT | Performed by: PHYSICAL THERAPIST

## 2023-01-05 NOTE — PROGRESS NOTES
Physical Therapy Daily Treatment Note      Patient: Archana Cisneros   : 1970  Referring practitioner: Maximus Bain MD  Date of Initial Visit: Type: THERAPY  Noted: 2022  Today's Date: 2023    VISIT#: 5          Subjective   Archana Cisneros reports: some exercises are causing her some pain.  She reports 6/10 pain level in the right lower back and hip.    Objective   See Exercise, Manual, and Modality Logs for complete treatment.       Assessment & Plan     Assessment    Assessment details: Progressed core stabilization exercises and instruction provided on SI belt wear with relief noted during gait with belt on.  R hip/PSIS pain during session periodically, especially when not stabilizing core with movement.            Progress per Plan of Care and Progress strengthening /stabilization /functional activity           Timed:  Manual Therapy:    15     mins  78044;  Therapeutic Exercise:    15     mins  71946;     Neuromuscular Funmi:    15    mins  27117;    Therapeutic Activity:          mins  87271;     Gait Training:           mins  32738;     Ultrasound:          mins  52968;    Electrical Stimulation:         mins  17301 ( );    Untimed:  Electrical Stimulation:         mins  58161 ( );  Mechanical Traction:         mins  75123;   Dry needling:       Self pay    Timed Treatment:   45   mins   Total Treatment:     45   mins  Alicia Simon PT, DPT, CLT, CIDN  Physical Therapist

## 2023-01-09 ENCOUNTER — TREATMENT (OUTPATIENT)
Dept: PHYSICAL THERAPY | Facility: CLINIC | Age: 53
End: 2023-01-09
Payer: MEDICARE

## 2023-01-09 DIAGNOSIS — M54.50 CHRONIC LOW BACK PAIN, UNSPECIFIED BACK PAIN LATERALITY, UNSPECIFIED WHETHER SCIATICA PRESENT: Primary | ICD-10-CM

## 2023-01-09 DIAGNOSIS — S33.5XXA LUMBAR SPRAIN, INITIAL ENCOUNTER: ICD-10-CM

## 2023-01-09 DIAGNOSIS — G89.29 CHRONIC LOW BACK PAIN, UNSPECIFIED BACK PAIN LATERALITY, UNSPECIFIED WHETHER SCIATICA PRESENT: Primary | ICD-10-CM

## 2023-01-09 PROCEDURE — 97140 MANUAL THERAPY 1/> REGIONS: CPT | Performed by: PHYSICAL THERAPIST

## 2023-01-09 PROCEDURE — 97110 THERAPEUTIC EXERCISES: CPT | Performed by: PHYSICAL THERAPIST

## 2023-01-09 PROCEDURE — 97112 NEUROMUSCULAR REEDUCATION: CPT | Performed by: PHYSICAL THERAPIST

## 2023-01-09 NOTE — PROGRESS NOTES
Physical Therapy Daily Treatment Note      Patient: Archana Cisneros   : 1970  Referring practitioner: Maximus Bain MD  Date of Initial Visit: Type: THERAPY  Noted: 2022  Today's Date: 2023    VISIT#: 6          Subjective   Archana Cisneros reports: she had decreased pain after last session in her back on the right side, but she still has pain with standing for prolonged periods of time (< 5 min).   She reports relief using SI belt.    Objective   See Exercise, Manual, and Modality Logs for complete treatment.       Assessment & Plan     Assessment    Assessment details: Pt reports decreased pain after treatment today.  Left hip flexor/TFL tight with IASTM on ITB and quad today bringing decreased soft tissue tension.  Progressed core stabilization as pt has difficulty with maintaining PPT during transitions and LE movement.            Progress per Plan of Care and Progress strengthening /stabilization /functional activity           Timed:  Manual Therapy:    15     mins  90518;  Therapeutic Exercise:    15     mins  15782;     Neuromuscular Funmi:    10    mins  71458;    Therapeutic Activity:          mins  41752;     Gait Training:           mins  35143;     Ultrasound:          mins  64641;    Electrical Stimulation:         mins  88293 ( );    Untimed:  Electrical Stimulation:         mins  12424 ( );  Mechanical Traction:         mins  06695;   Dry needling:       Self pay    Timed Treatment:   40   mins   Total Treatment:     40   mins  Alicia Simon PT, DPT, CLT, CIDN  Physical Therapist

## 2023-01-11 ENCOUNTER — TREATMENT (OUTPATIENT)
Dept: PHYSICAL THERAPY | Facility: CLINIC | Age: 53
End: 2023-01-11
Payer: MEDICARE

## 2023-01-11 DIAGNOSIS — G89.29 CHRONIC LOW BACK PAIN, UNSPECIFIED BACK PAIN LATERALITY, UNSPECIFIED WHETHER SCIATICA PRESENT: Primary | ICD-10-CM

## 2023-01-11 DIAGNOSIS — S33.5XXA LUMBAR SPRAIN, INITIAL ENCOUNTER: ICD-10-CM

## 2023-01-11 DIAGNOSIS — M54.50 CHRONIC LOW BACK PAIN, UNSPECIFIED BACK PAIN LATERALITY, UNSPECIFIED WHETHER SCIATICA PRESENT: Primary | ICD-10-CM

## 2023-01-11 PROCEDURE — 97110 THERAPEUTIC EXERCISES: CPT | Performed by: PHYSICAL THERAPIST

## 2023-01-11 PROCEDURE — 97112 NEUROMUSCULAR REEDUCATION: CPT | Performed by: PHYSICAL THERAPIST

## 2023-01-11 PROCEDURE — 97530 THERAPEUTIC ACTIVITIES: CPT | Performed by: PHYSICAL THERAPIST

## 2023-01-11 NOTE — PROGRESS NOTES
Physical Therapy Daily Treatment Note      Patient: Archana Cisneros   : 1970  Referring practitioner: Maximus Bain MD  Date of Initial Visit: Type: THERAPY  Noted: 2022  Today's Date: 2023    VISIT#: 7          Subjective   Archana Cisneros reports: she is sore on the right side today.      Objective   See Exercise, Manual, and Modality Logs for complete treatment.       Assessment & Plan     Assessment    Assessment details: Reviewed HEP with handouts provided for stretches to allow correct form and execution.  SI mobs continue to assist in correcting alignment.            Progress per Plan of Care and Progress strengthening /stabilization /functional activity           Timed:  Manual Therapy:         mins  97758;  Therapeutic Exercise:    15     mins  26871;     Neuromuscular Funmi:    15    mins  36482;    Therapeutic Activity:     10     mins  82971;     Gait Training:           mins  02401;     Ultrasound:          mins  15747;    Electrical Stimulation:         mins  50585 ( );    Untimed:  Electrical Stimulation:         mins  62141 ( );  Mechanical Traction:         mins  20019;   Dry needling:       Self pay    Timed Treatment:  40    mins   Total Treatment:     40   mins  Alicia Simon PT, DPT, CLT, CIDN  Physical Therapist

## 2023-01-16 ENCOUNTER — TREATMENT (OUTPATIENT)
Dept: PHYSICAL THERAPY | Facility: CLINIC | Age: 53
End: 2023-01-16
Payer: MEDICARE

## 2023-01-16 DIAGNOSIS — S33.5XXA LUMBAR SPRAIN, INITIAL ENCOUNTER: ICD-10-CM

## 2023-01-16 DIAGNOSIS — G89.29 CHRONIC LOW BACK PAIN, UNSPECIFIED BACK PAIN LATERALITY, UNSPECIFIED WHETHER SCIATICA PRESENT: Primary | ICD-10-CM

## 2023-01-16 DIAGNOSIS — M54.50 CHRONIC LOW BACK PAIN, UNSPECIFIED BACK PAIN LATERALITY, UNSPECIFIED WHETHER SCIATICA PRESENT: Primary | ICD-10-CM

## 2023-01-16 PROCEDURE — 97110 THERAPEUTIC EXERCISES: CPT | Performed by: PHYSICAL THERAPIST

## 2023-01-16 PROCEDURE — 97530 THERAPEUTIC ACTIVITIES: CPT | Performed by: PHYSICAL THERAPIST

## 2023-01-16 PROCEDURE — 97140 MANUAL THERAPY 1/> REGIONS: CPT | Performed by: PHYSICAL THERAPIST

## 2023-01-16 NOTE — PROGRESS NOTES
Physical Therapy Daily Treatment Note      Patient: Archana Cisneros   : 1970  Referring practitioner: Maximus Bain MD  Date of Initial Visit: Type: THERAPY  Noted: 2022  Today's Date: 2023    VISIT#: 8          Subjective   Archana Cisneros reports: she had some increased pain after going up and down steps several flights with laundry yesterday.      Objective   See Exercise, Manual, and Modality Logs for complete treatment.       Assessment & Plan     Assessment    Assessment details: Answered patients questions regarding posture and positioning during sitting and sleeping and walking.            Progress per Plan of Care and Progress strengthening /stabilization /functional activity           Timed:  Manual Therapy:    15     mins  32971;  Therapeutic Exercise:    15     mins  75375;     Neuromuscular Funmi:        mins  38760;    Therapeutic Activity:     15     mins  47608;     Gait Training:           mins  16015;     Ultrasound:          mins  12937;    Electrical Stimulation:         mins  14168 ( );    Untimed:  Electrical Stimulation:         mins  44270 ( );  Mechanical Traction:         mins  87404;   Dry needling:       Self pay    Timed Treatment:   45   mins   Total Treatment:     45   mins  Alicia Simon PT, DPT, CLT, CIDN  Physical Therapist

## 2023-01-18 ENCOUNTER — TREATMENT (OUTPATIENT)
Dept: PHYSICAL THERAPY | Facility: CLINIC | Age: 53
End: 2023-01-18
Payer: MEDICARE

## 2023-01-18 DIAGNOSIS — S33.5XXA LUMBAR SPRAIN, INITIAL ENCOUNTER: ICD-10-CM

## 2023-01-18 DIAGNOSIS — M54.50 CHRONIC LOW BACK PAIN, UNSPECIFIED BACK PAIN LATERALITY, UNSPECIFIED WHETHER SCIATICA PRESENT: Primary | ICD-10-CM

## 2023-01-18 DIAGNOSIS — S83.91XA SPRAIN OF RIGHT KNEE, UNSPECIFIED LIGAMENT, INITIAL ENCOUNTER: ICD-10-CM

## 2023-01-18 DIAGNOSIS — G89.29 CHRONIC LOW BACK PAIN, UNSPECIFIED BACK PAIN LATERALITY, UNSPECIFIED WHETHER SCIATICA PRESENT: Primary | ICD-10-CM

## 2023-01-18 DIAGNOSIS — S73.101A SPRAIN OF RIGHT HIP, INITIAL ENCOUNTER: ICD-10-CM

## 2023-01-18 PROCEDURE — 97110 THERAPEUTIC EXERCISES: CPT | Performed by: PHYSICAL THERAPIST

## 2023-01-18 PROCEDURE — 97530 THERAPEUTIC ACTIVITIES: CPT | Performed by: PHYSICAL THERAPIST

## 2023-01-18 PROCEDURE — 97112 NEUROMUSCULAR REEDUCATION: CPT | Performed by: PHYSICAL THERAPIST

## 2023-01-18 NOTE — PROGRESS NOTES
Physical Therapy Progress  Note/Authorization Note      Patient: Archana Cisneros   : 1970  Referring practitioner: Maximus Bain MD  Date of Initial Visit: Type: THERAPY  Noted: 2022  Today's Date: 2023    VISIT#: 9          Subjective Evaluation    Pain  Current pain ratin  At best pain ratin  At worst pain rating: 10         Archana Cisneros reports: 9/10 pain level in her SI area and right side of hip.  She states she did not even do her stretches due to the pain yesterday.     GEMA = 31/50 = 62% limited     Objective        Special Questions  Patient is experiencing night pain, disturbed sleep, dizziness, headaches, nausea/motion sickness and ulcer problem.     Additional Special Questions  Per pt report; bladder surgery  (prolapsed)      Postural Observations  Seated posture: good  Standing posture: good  Correction of posture: has no consistent effect    Additional Postural Observation Details  Slumped shoulders B and forward head position and decreased lumbar lordosis     Palpation     Additional Palpation Details  ttp and hypertonic in R piriformis and greater trochanter    Tenderness     Left Hip   No tenderness in the PSIS.     Right Hip   Tenderness in the PSIS.     Neurological Testing     Sensation     Lumbar   Left   Intact: light touch    Comments   Right light touch: L2 hypersensative and diminished L3    Active Range of Motion     Lumbar   Flexion: WFL  Extension: WFL  Left lateral flexion: WFL  Right lateral flexion: WFL  Left rotation: WFL  Right rotation: WFL    Additional Active Range of Motion Details  Pain on right PSIS with lateral flexion to the right and with flexion forward    Passive Range of Motion     Additional Passive Range of Motion Details  B hips WFL in flexion, IR and external rotation    Strength/Myotome Testing     Left Hip   Planes of Motion   Flexion: 5  Extension: 4-  Abduction: 5    Right Hip   Planes of Motion   Flexion: 5 (pain in  LB)  Extension: 4-  Abduction: 4+    Tests     Left Hip   Mark: Negative.   90/90 SLR: Negative.     Right Hip   90/90 SLR: Negative.      See Exercise, Manual, and Modality Logs for complete treatment.       Assessment & Plan       Goals  Plan Goals: Plan Goals: 1. The patient complains of low back pain.  LTG 1: 12 weeks:  The patient will report a pain rating of 3/10 or better in order to improve tolerance to activities of daily living and improve sleep quality.  NOT MET  STG 1a: 4 weeks:  The patient will report a pain rating of 5/10 or better.  Met for best pain level, but not current or worst levels  NOT MET    2. The patient demonstrates weakness of the R hip.  LTG 2: 12 weeks:  The patient will demonstrate 5 /5 strength for R hip flexion, abduction, and extension in order to improve hip stability.  NOT MET  STG 2a: 4 weeks:  The patient will demonstrate 4+ /5 strength for R hip flexion, abduction,  and extension.MET for some      Plan  Therapy options: will be seen for skilled therapy services  Frequency: 2x week  Duration in weeks: 12          Progress per Plan of Care and Progress strengthening /stabilization /functional activity           Timed:  Manual Therapy:         mins  25354;  Therapeutic Exercise:    15     mins  36552;     Neuromuscular Fumni:    15    mins  53186;    Therapeutic Activity:     10     mins  70292;     Gait Training:           mins  88142;     Ultrasound:          mins  89163;    Electrical Stimulation:         mins  54393 ( );    Untimed:  Electrical Stimulation:         mins  15325 ( );  Mechanical Traction:         mins  66570;   Dry needling:       Self pay    Timed Treatment:   40   mins   Total Treatment:     40   mins  Alicia Simon PT, DPT, CLT, NUBIAN  Physical Therapist

## 2023-01-18 NOTE — PROGRESS NOTES
Physical Therapy Daily Treatment Note    [facility] [facility] [address] [address], [address]  [place]  [address] [phone]      Patient: Archana Cisneros   : 1970  Diagnosis/ICD-10 Code:  Chronic low back pain, unspecified back pain laterality, unspecified whether sciatica present [M54.50, G89.29]  Referring practitioner: Maximus Bain MD  Date of Initial Visit: 2022  Today's Date: 2022  Patient seen for 3 sessions .  POC 2x/wk x 12 weeks, exp 23  Insurance , exp 23      Precautions: fibromyalgia, angina, anxiety, arthritis, asthma, depression, headaches, neck injury, osteopenia, SOA, UTI      VISIT#: 3      Subjective  Patient reports she did fine after previous session.  Pain 2/10 center and right side low back. Pt. Reports she has a heel lift for R LE and an SI belt but does not use them.       Objective     See Exercise, Manual, and Modality Logs for complete treatment.  Progression as noted. Trialed SI belt.     Pt. With leg length discrepancy, right being shorter than left lower extremity.     Patient Education:  HEP progressed and issued.  See chart.     Exercises  Hooklying Sacroiliac Joint Isometric - 1 x daily - 7 x weekly - 1 sets - 4 reps - 5 seconds hold    Assessment/Plan:  Pt. Responded well to SI mobilizations with decreased pain afterward.  Also, less pain during gait using SI belt.  Question SI involvement.       Progress per Plan of Care:  Monitor response. Assess SI joint.             Timed:         Manual Therapy:         mins  38574;     Therapeutic Exercise:    35     mins  33713;     Neuromuscular Funmi:   10     mins  13864;    Therapeutic Activity:          mins  64798;     Gait Training:           mins  60470;     Ultrasound:          mins  69641;    Ionto                                   mins   93814  Self Care                            mins   59246  Aquatic                               mins 51668    Un-Timed:  Electrical Stimulation:         mins  24546 (  );  Traction          mins 98445      Timed Treatment: 45     mins   Total Treatment:   45    mins    Jing Blanco PTA  Physical Therapist Assistant   [place] license:  #[id number]   room air

## 2023-01-23 ENCOUNTER — TREATMENT (OUTPATIENT)
Dept: PHYSICAL THERAPY | Facility: CLINIC | Age: 53
End: 2023-01-23
Payer: MEDICARE

## 2023-01-23 DIAGNOSIS — S73.101A SPRAIN OF RIGHT HIP, INITIAL ENCOUNTER: ICD-10-CM

## 2023-01-23 DIAGNOSIS — G89.29 CHRONIC LOW BACK PAIN, UNSPECIFIED BACK PAIN LATERALITY, UNSPECIFIED WHETHER SCIATICA PRESENT: Primary | ICD-10-CM

## 2023-01-23 DIAGNOSIS — S33.5XXA LUMBAR SPRAIN, INITIAL ENCOUNTER: ICD-10-CM

## 2023-01-23 DIAGNOSIS — M54.50 CHRONIC LOW BACK PAIN, UNSPECIFIED BACK PAIN LATERALITY, UNSPECIFIED WHETHER SCIATICA PRESENT: Primary | ICD-10-CM

## 2023-01-23 DIAGNOSIS — S83.91XA SPRAIN OF RIGHT KNEE, UNSPECIFIED LIGAMENT, INITIAL ENCOUNTER: ICD-10-CM

## 2023-01-23 PROCEDURE — 97530 THERAPEUTIC ACTIVITIES: CPT | Performed by: PHYSICAL THERAPIST

## 2023-01-23 PROCEDURE — 97140 MANUAL THERAPY 1/> REGIONS: CPT | Performed by: PHYSICAL THERAPIST

## 2023-01-23 PROCEDURE — 97110 THERAPEUTIC EXERCISES: CPT | Performed by: PHYSICAL THERAPIST

## 2023-01-23 NOTE — PROGRESS NOTES
Physical Therapy Daily Treatment Note      Patient: Archana Cisneros   : 1970  Referring practitioner: Maximus Bain MD  Date of Initial Visit: Type: THERAPY  Noted: 2022  Today's Date: 2023    VISIT#: 10          Subjective   Archana Cisneros reports: right inner thigh spasms sometimes when walking.      Objective   See Exercise, Manual, and Modality Logs for complete treatment.       Assessment & Plan     Assessment    Assessment details: SI alignment was good today and did not need corrected.  R hip soreness with manual performed to decreased soft tissue tension.            Progress per Plan of Care and Progress strengthening /stabilization /functional activity           Timed:  Manual Therapy:    15     mins  75607;  Therapeutic Exercise:    15     mins  15995;     Neuromuscular Funmi:        mins  89446;    Therapeutic Activity:     15     mins  13789;     Gait Training:           mins  89589;     Ultrasound:          mins  92818;    Electrical Stimulation:         mins  54954 ( );    Untimed:  Electrical Stimulation:         mins  99271 ( );  Mechanical Traction:         mins  32701;   Dry needling:       Self pay    Timed Treatment:   45   mins   Total Treatment:     45   mins  Alicia Simon PT, DPT, CLT, CIDN  Physical Therapist

## 2023-01-25 ENCOUNTER — TREATMENT (OUTPATIENT)
Dept: PHYSICAL THERAPY | Facility: CLINIC | Age: 53
End: 2023-01-25
Payer: MEDICARE

## 2023-01-25 DIAGNOSIS — S73.101A SPRAIN OF RIGHT HIP, INITIAL ENCOUNTER: ICD-10-CM

## 2023-01-25 DIAGNOSIS — M54.50 CHRONIC LOW BACK PAIN, UNSPECIFIED BACK PAIN LATERALITY, UNSPECIFIED WHETHER SCIATICA PRESENT: Primary | ICD-10-CM

## 2023-01-25 DIAGNOSIS — G89.29 CHRONIC LOW BACK PAIN, UNSPECIFIED BACK PAIN LATERALITY, UNSPECIFIED WHETHER SCIATICA PRESENT: Primary | ICD-10-CM

## 2023-01-25 DIAGNOSIS — S33.5XXA LUMBAR SPRAIN, INITIAL ENCOUNTER: ICD-10-CM

## 2023-01-25 PROCEDURE — G0283 ELEC STIM OTHER THAN WOUND: HCPCS | Performed by: PHYSICAL THERAPIST

## 2023-01-25 PROCEDURE — 97140 MANUAL THERAPY 1/> REGIONS: CPT | Performed by: PHYSICAL THERAPIST

## 2023-01-25 NOTE — PROGRESS NOTES
Physical Therapy Daily Treatment Note      Patient: Archana Cisneros   : 1970  Referring practitioner: Maximus Bain MD  Date of Initial Visit: Type: THERAPY  Noted: 2022  Today's Date: 2023    VISIT#: 11          Subjective   Archana Cisneros reports: some left ITB burning pain today and lower back feels better.      Objective   See Exercise, Manual, and Modality Logs for complete treatment.       Assessment & Plan     Assessment    Assessment details: Pt arrived late thinking her appointment was scheduled later, so limited time for treatment today.  Estim at end with  for L ITB tightness and soreness.  Manual performed to decrease soft tissue tightness and decreased pain.            Progress per Plan of Care and Progress strengthening /stabilization /functional activity           Timed:  Manual Therapy:   8      mins  19476;  Therapeutic Exercise:         mins  73240;     Neuromuscular Funmi:        mins  50370;    Therapeutic Activity:          mins  44393;     Gait Training:           mins  92945;     Ultrasound:          mins  06359;    Electrical Stimulation:         mins  39191 ( );    Untimed:  Electrical Stimulation:    18     mins  33638 ( );  Mechanical Traction:         mins  14194;   Dry needling:       Self pay    Timed Treatment:   8   mins   Total Treatment:     26   mins  Alicia Simon PT, DPT, CLT, CIDN  Physical Therapist

## 2023-01-30 ENCOUNTER — TREATMENT (OUTPATIENT)
Dept: PHYSICAL THERAPY | Facility: CLINIC | Age: 53
End: 2023-01-30
Payer: MEDICARE

## 2023-01-30 DIAGNOSIS — S33.5XXA LUMBAR SPRAIN, INITIAL ENCOUNTER: ICD-10-CM

## 2023-01-30 DIAGNOSIS — G89.29 CHRONIC LOW BACK PAIN, UNSPECIFIED BACK PAIN LATERALITY, UNSPECIFIED WHETHER SCIATICA PRESENT: Primary | ICD-10-CM

## 2023-01-30 DIAGNOSIS — M54.50 CHRONIC LOW BACK PAIN, UNSPECIFIED BACK PAIN LATERALITY, UNSPECIFIED WHETHER SCIATICA PRESENT: Primary | ICD-10-CM

## 2023-01-30 PROCEDURE — 97110 THERAPEUTIC EXERCISES: CPT | Performed by: PHYSICAL THERAPIST

## 2023-01-30 PROCEDURE — 97112 NEUROMUSCULAR REEDUCATION: CPT | Performed by: PHYSICAL THERAPIST

## 2023-01-30 PROCEDURE — 97140 MANUAL THERAPY 1/> REGIONS: CPT | Performed by: PHYSICAL THERAPIST

## 2023-01-30 PROCEDURE — G0283 ELEC STIM OTHER THAN WOUND: HCPCS | Performed by: PHYSICAL THERAPIST

## 2023-01-30 NOTE — PROGRESS NOTES
Physical Therapy Daily Treatment Note     [facility]   [address]  [place] [address]    Patient:  Archana Cisneros  :  1970  Referring practitioner:  Maximus Bain MD  Date of Initial Visit:  Type: THERAPY  Noted: 2022  Today's Date:  2023      Visit Diagnoses:    ICD-10-CM ICD-9-CM   1. Chronic low back pain, unspecified back pain laterality, unspecified whether sciatica present  M54.50 724.2    G89.29 338.29   2. Lumbar sprain, initial encounter  S33.5XXA 847.2       VISIT#:  12 in POC.    Subjective   Archana Cisneros reports she cont to have R buttock region pain.  Had a lot of pain yesterday for no known reason.  Not sure if modalities helped last visit, but willing to try it again today.      Objective   See exercise, manual, and modality logs for complete treatment.       ASSESSMENT  Response to manual in treatment log.  Pt tolerated progression of exercises / activities without problems.  Cues as noted.  Mult cues to avoid breath-holding throughout exercises and to keep core engaged.  Discomfort with bed mobility with mod slowed transitional speeds.  Held selected exercises per time/positional discomfort.  Felt better after modalities; wants to cont these next visit.  No complications today.      PLAN  Continue current POC and progress as tolerated per PT evaluation.      Timed:  Therapeutic Exercise:    13     mins  90946;     Neuromuscular Funmi:    13    mins  21234;     Manual Therapy:    18     mins  39523;     Un-Timed:  Electrical Stimulation:    15     mins  71571 ( );    Timed Treatment:   44   mins   Total Treatment:     59   mins      Ventura Swain PT  [place] License # [id number]  Physical Therapist

## 2023-02-01 ENCOUNTER — TREATMENT (OUTPATIENT)
Dept: PHYSICAL THERAPY | Facility: CLINIC | Age: 53
End: 2023-02-01
Payer: MEDICARE

## 2023-02-01 DIAGNOSIS — G89.29 CHRONIC LOW BACK PAIN, UNSPECIFIED BACK PAIN LATERALITY, UNSPECIFIED WHETHER SCIATICA PRESENT: Primary | ICD-10-CM

## 2023-02-01 DIAGNOSIS — S33.5XXA LUMBAR SPRAIN, INITIAL ENCOUNTER: ICD-10-CM

## 2023-02-01 DIAGNOSIS — M54.50 CHRONIC LOW BACK PAIN, UNSPECIFIED BACK PAIN LATERALITY, UNSPECIFIED WHETHER SCIATICA PRESENT: Primary | ICD-10-CM

## 2023-02-01 PROCEDURE — 97110 THERAPEUTIC EXERCISES: CPT | Performed by: PHYSICAL THERAPIST

## 2023-02-01 PROCEDURE — 97112 NEUROMUSCULAR REEDUCATION: CPT | Performed by: PHYSICAL THERAPIST

## 2023-02-01 PROCEDURE — 97140 MANUAL THERAPY 1/> REGIONS: CPT | Performed by: PHYSICAL THERAPIST

## 2023-02-01 PROCEDURE — G0283 ELEC STIM OTHER THAN WOUND: HCPCS | Performed by: PHYSICAL THERAPIST

## 2023-02-01 NOTE — PROGRESS NOTES
Physical Therapy Daily Treatment Note     [facility]   [address]  [place] [address]    Patient:  Archana Cisneros  :  1970  Referring practitioner:  Maximus Bain MD  Date of Initial Visit:  Type: THERAPY  Noted: 2022  Today's Date:  2023      Visit Diagnoses:    ICD-10-CM ICD-9-CM   1. Chronic low back pain, unspecified back pain laterality, unspecified whether sciatica present  M54.50 724.2    G89.29 338.29   2. Lumbar sprain, initial encounter  S33.5XXA 847.2       VISIT#:  13 in POC.    Subjective   Archana Cisneros reports she felt okay after her last therapy session; some soreness later after appt.  Interested in cont modalities again today because it seems to help.      Objective   See exercise, manual, and modality logs for complete treatment.     Cues for core activation with bed mobility, which is mod slowed and hesitant.    ASSESSMENT  Response to manual in treatment log.  Pt tolerated exercises / activities without problems.  Mult cues required throughout as noted, jeremy to keep core engaged to limit LBP with exercises/functional transfers.  Held selected exercises per time.  Fatigued with exercises at end of session today.  Better after modalities again today.  No complications today.      PLAN  Continue current POC and progress as tolerated per PT evaluation.      Timed:  Therapeutic Exercise:    11     mins  26378;     Neuromuscular Funmi:    13    mins  54056;     Manual Therapy:    15     mins  88132;       Un-Timed:  Electrical Stimulation:    15     mins  92057 ( );      Timed Treatment:   39   mins   Total Treatment:     54   mins      Ventura Swain, PT  [place] License # [id number]  Physical Therapist

## 2023-02-07 ENCOUNTER — TREATMENT (OUTPATIENT)
Dept: PHYSICAL THERAPY | Facility: CLINIC | Age: 53
End: 2023-02-07
Payer: MEDICARE

## 2023-02-07 DIAGNOSIS — M54.50 CHRONIC LOW BACK PAIN, UNSPECIFIED BACK PAIN LATERALITY, UNSPECIFIED WHETHER SCIATICA PRESENT: Primary | ICD-10-CM

## 2023-02-07 DIAGNOSIS — G89.29 CHRONIC LOW BACK PAIN, UNSPECIFIED BACK PAIN LATERALITY, UNSPECIFIED WHETHER SCIATICA PRESENT: Primary | ICD-10-CM

## 2023-02-07 DIAGNOSIS — S33.5XXA LUMBAR SPRAIN, INITIAL ENCOUNTER: ICD-10-CM

## 2023-02-07 PROCEDURE — 97112 NEUROMUSCULAR REEDUCATION: CPT | Performed by: PHYSICAL THERAPIST

## 2023-02-07 PROCEDURE — 97140 MANUAL THERAPY 1/> REGIONS: CPT | Performed by: PHYSICAL THERAPIST

## 2023-02-07 PROCEDURE — 97110 THERAPEUTIC EXERCISES: CPT | Performed by: PHYSICAL THERAPIST

## 2023-02-07 NOTE — PROGRESS NOTES
Physical Therapy Daily Treatment Note    [facility], [address]  [place]  [address] [phone]      Patient: Archana Cisneros   : 1970  Diagnosis/ICD-10 Code:  Chronic low back pain, unspecified back pain laterality, unspecified whether sciatica present [M54.50, G89.29]  Referring practitioner: Maximus Bain MD  Date of Initial Visit: 2023  Today's Date: 2023  Patient seen for 14 sessions .  POC 2x/wk x 12 weeks, exp 23  Insurance , exp 23      Precautions: fibromyalgia, angina, anxiety, arthritis, asthma, depression, headaches, neck injury, osteopenia, SOA, UTI      VISIT#: 14      Subjective  :  Patient reports little change in pain/symptoms.  Pain 2/10, Right low back, buttock, hip and lateral right leg to mid thigh.  She does not think IFC is beneficial as no lasting affect.       Objective     See Exercise, Manual, and Modality Logs for complete treatment.  Held IFC.      Patient Education:          Assessment/Plan:  Held IFC as patient does not feel benefit. IASTM vs. STM.  IASTM seemed to help with patient feeling less tension in areas of pain.     Progress per Plan of Care:  Monitor response.             Timed:         Manual Therapy:  10       mins  46330;     Therapeutic Exercise:     20    mins  76537;     Neuromuscular Funmi:   15     mins  05536;    Therapeutic Activity:          mins  75085;     Gait Training:           mins  12369;     Ultrasound:          mins  72655;    Ionto                                   mins   90098  Self Care                            mins   28048  Aquatic                               mins 35601    Un-Timed:  Electrical Stimulation:         mins  00545 ( );  Traction          mins 93526      Timed Treatment: 45     mins   Total Treatment:   45    mins    Jing Blanco PTA  Physical Therapist Assistant   [place] license:  #[id number]

## 2023-02-09 ENCOUNTER — TREATMENT (OUTPATIENT)
Dept: PHYSICAL THERAPY | Facility: CLINIC | Age: 53
End: 2023-02-09
Payer: MEDICARE

## 2023-02-09 DIAGNOSIS — S73.101A SPRAIN OF RIGHT HIP, INITIAL ENCOUNTER: ICD-10-CM

## 2023-02-09 DIAGNOSIS — M54.50 CHRONIC LOW BACK PAIN, UNSPECIFIED BACK PAIN LATERALITY, UNSPECIFIED WHETHER SCIATICA PRESENT: Primary | ICD-10-CM

## 2023-02-09 DIAGNOSIS — S33.5XXA LUMBAR SPRAIN, INITIAL ENCOUNTER: ICD-10-CM

## 2023-02-09 DIAGNOSIS — G89.29 CHRONIC LOW BACK PAIN, UNSPECIFIED BACK PAIN LATERALITY, UNSPECIFIED WHETHER SCIATICA PRESENT: Primary | ICD-10-CM

## 2023-02-09 PROCEDURE — 97140 MANUAL THERAPY 1/> REGIONS: CPT | Performed by: PHYSICAL THERAPIST

## 2023-02-09 PROCEDURE — 97110 THERAPEUTIC EXERCISES: CPT | Performed by: PHYSICAL THERAPIST

## 2023-02-09 PROCEDURE — 97035 APP MDLTY 1+ULTRASOUND EA 15: CPT | Performed by: PHYSICAL THERAPIST

## 2023-02-09 NOTE — PROGRESS NOTES
Physical Therapy Daily Treatment Note      Patient: Archana Cisneros   : 1970  Referring practitioner: Maximus Bain MD  Date of Initial Visit: Type: THERAPY  Noted: 2022  Today's Date: 2023    VISIT#: 15          Subjective   Archana Cisneros reports: some pain in the right lower back when using R arms on NuStep.  Yesterday she had some catching and significant pain when coming up from bending over.     Objective   See Exercise, Manual, and Modality Logs for complete treatment.       Assessment & Plan     Assessment    Assessment details: Ultrasound performed with manual IASTM performed to decrease right glute and piriformis and for improved healing and circulation to the right PSIS.  Decreased pain and improved movement quality noted after session.            Progress per Plan of Care and Progress strengthening /stabilization /functional activity           Timed:  Manual Therapy:    10     mins  54004;  Therapeutic Exercise:    10     mins  29797;     Neuromuscular Funmi:        mins  40315;    Therapeutic Activity:     8     mins  79160;     Gait Training:           mins  09904;     Ultrasound:     10     mins  20378;    Electrical Stimulation:         mins  43262 ( );    Untimed:  Electrical Stimulation:         mins  87011 ( );  Mechanical Traction:         mins  42783;   Dry needling:       Self pay    Timed Treatment:   38   mins   Total Treatment:    38    mins  Alicia Simon PT, DPT, CLT, NUBIAN  Physical Therapist

## 2023-02-16 ENCOUNTER — TREATMENT (OUTPATIENT)
Dept: PHYSICAL THERAPY | Facility: CLINIC | Age: 53
End: 2023-02-16
Payer: MEDICARE

## 2023-02-16 DIAGNOSIS — S73.101A SPRAIN OF RIGHT HIP, INITIAL ENCOUNTER: ICD-10-CM

## 2023-02-16 DIAGNOSIS — G89.29 CHRONIC LOW BACK PAIN, UNSPECIFIED BACK PAIN LATERALITY, UNSPECIFIED WHETHER SCIATICA PRESENT: Primary | ICD-10-CM

## 2023-02-16 DIAGNOSIS — S33.5XXA LUMBAR SPRAIN, INITIAL ENCOUNTER: ICD-10-CM

## 2023-02-16 DIAGNOSIS — M54.50 CHRONIC LOW BACK PAIN, UNSPECIFIED BACK PAIN LATERALITY, UNSPECIFIED WHETHER SCIATICA PRESENT: Primary | ICD-10-CM

## 2023-02-16 PROCEDURE — 97110 THERAPEUTIC EXERCISES: CPT | Performed by: PHYSICAL THERAPIST

## 2023-02-16 PROCEDURE — 97530 THERAPEUTIC ACTIVITIES: CPT | Performed by: PHYSICAL THERAPIST

## 2023-02-16 PROCEDURE — 97535 SELF CARE MNGMENT TRAINING: CPT | Performed by: PHYSICAL THERAPIST

## 2023-02-16 NOTE — PROGRESS NOTES
Physical Therapy Re Certification Of Plan of Care  Patient: Archana Cisneros   : 1970  Diagnosis/ICD-10 Code:  Chronic low back pain, unspecified back pain laterality, unspecified whether sciatica present [M54.50, G89.29]  Referring practitioner: Maximus Bain MD  Date of Initial Visit: Type: THERAPY  Noted: 2022  Today's Date: 2023  Patient seen for 16 sessions         Visit Diagnoses:    ICD-10-CM ICD-9-CM   1. Chronic low back pain, unspecified back pain laterality, unspecified whether sciatica present  M54.50 724.2    G89.29 338.29   2. Lumbar sprain, initial encounter  S33.5XXA 847.2   3. Sprain of right hip, initial encounter  S73.101A 843.9         Archana Cisneros reports: she is improved overall, but still has some sharp jabbing pain when bending over and returning to stand and movement in bed is painful.    Subjective Questionnaire: Oswestry: 30/50 = 605 limited  Clinical Progress: improved  Home Program Compliance: Yes  Treatment has included: therapeutic exercise, neuromuscular re-education, manual therapy, therapeutic activity, electrical stimulation, ultrasound, moist heat and cryotherapy      Subjective Evaluation    Pain  Current pain ratin  At best pain ratin  At worst pain ratin  Location: R lower back and buttocks             Objective          Postural Observations  Seated posture: good  Standing posture: good  Correction of posture: has no consistent effect    Additional Postural Observation Details  Slumped shoulders B and forward head position and decreased lumbar lordosis     Palpation     Additional Palpation Details  ttp and hypertonic in R more than L piriformis and PSIS, R greater trochanter, R glute med and TFL    Tenderness     Left Hip   Tenderness in the PSIS.     Right Hip   Tenderness in the PSIS.     Neurological Testing     Sensation     Lumbar   Left   Intact: light touch    Right   Intact: light touch    Active Range of Motion     Lumbar    Flexion: WFL  Extension: WFL  Left lateral flexion: WFL  Right lateral flexion: WFL  Left rotation: WFL  Right rotation: WFL    Additional Active Range of Motion Details  Pain on right PSIS with lateral flexion to the right and with flexion forward    Passive Range of Motion     Additional Passive Range of Motion Details  B hips WFL in flexion, IR and external rotation    Strength/Myotome Testing     Left Hip   Planes of Motion   Flexion: 5  Extension: 4  Abduction: 5    Right Hip   Planes of Motion   Flexion: 5 (pain in LB)  Extension: 4  Abduction: 4+    Tests     Left Hip   Mark: Negative.   90/90 SLR: Negative.     Right Hip   Mark: Positive.   90/90 SLR: Negative.          Assessment & Plan     Assessment    Assessment details: Pt states she is 25% overall improved, noting decreased pain overall, but she still has intermittent sharp, jabbing pains with transitional movements (moving in bed, sit to stand, R hip flexion, bending over and standing back straight, getting in and out of car).  She can walk and stand longer periods without pain now.  Her GEMA is 60% limited, which is slightly decreased from before, demonstrating improved function. She has improved hip strength, but is still tight in her right TFL and ttp and has pain with functional mobility.  Her sensory impairments in the L2/3 pathways are gone now.  She may benefit from continued skilled therapy to address her remaining deficits and achieve LTGs.       Goals  Plan Goals: Plan Goals: 1. The patient complains of low back pain.  LTG 1: 12 weeks:  The patient will report a pain rating of 3/10 or better in order to improve tolerance to activities of daily living and improve sleep quality.  PROGRESSING  STG 1a: 4 weeks:  The patient will report a pain rating of 5/10 or better. MET    2. The patient demonstrates weakness of the R hip.  LTG 2: 12 weeks:  The patient will demonstrate 5 /5 strength for R hip flexion, abduction, and extension in order to  improve hip stability.  MET for some  STG 2a: 4 weeks:  The patient will demonstrate 4+ /5 strength for R hip flexion, abduction,  and extension.MET for flexion and abduction and progressing for extension      Plan  Therapy options: will be seen for skilled therapy services           Recommendations: Continue as planned  Timeframe: 3 months  Prognosis to achieve goals: fair      Timed:         Manual Therapy:         mins  35012;     Therapeutic Exercise:    22     mins  07215;     Neuromuscular Funmi:        mins  50088;    Therapeutic Activity:     10     mins  23079;     Gait Training:           mins  28364;     Ultrasound:          mins  94138;    Ionto                                   mins   55775  Self Care                       8     mins   94761  Canalith Repos         mins 48198      Un-Timed:  Electrical Stimulation:         mins  15706 ( );  Dry Needling          mins self-pay  Traction          mins 96773  Re-Eval                               mins  97200      Timed Treatment:   40   mins   Total Treatment:     40   mins          PT: Alicia Simon PT       Electronically signed by Alicia Simon PT, 02/16/23, 10:56 AM EST    Certification Period: 2/16/2023 thru 5/16/2023  I certify that the therapy services are furnished while this patient is under my care.  The services outlined above are required by this patient, and will be reviewed every 90 days.         Physician Signature:__________________________________________________    PHYSICIAN: Maximus Bain MD  NPI: [id number]                                      DATE:  :     Please sign and return via fax to [email or web address] . Thank you, [facility] Physical Therapy

## 2023-02-21 ENCOUNTER — TREATMENT (OUTPATIENT)
Dept: PHYSICAL THERAPY | Facility: CLINIC | Age: 53
End: 2023-02-21
Payer: MEDICARE

## 2023-02-21 DIAGNOSIS — S33.5XXA LUMBAR SPRAIN, INITIAL ENCOUNTER: ICD-10-CM

## 2023-02-21 DIAGNOSIS — G89.29 CHRONIC LOW BACK PAIN, UNSPECIFIED BACK PAIN LATERALITY, UNSPECIFIED WHETHER SCIATICA PRESENT: Primary | ICD-10-CM

## 2023-02-21 DIAGNOSIS — S73.101A SPRAIN OF RIGHT HIP, INITIAL ENCOUNTER: ICD-10-CM

## 2023-02-21 DIAGNOSIS — M54.50 CHRONIC LOW BACK PAIN, UNSPECIFIED BACK PAIN LATERALITY, UNSPECIFIED WHETHER SCIATICA PRESENT: Primary | ICD-10-CM

## 2023-02-21 PROCEDURE — 97035 APP MDLTY 1+ULTRASOUND EA 15: CPT | Performed by: PHYSICAL THERAPIST

## 2023-02-21 PROCEDURE — 97140 MANUAL THERAPY 1/> REGIONS: CPT | Performed by: PHYSICAL THERAPIST

## 2023-02-21 PROCEDURE — 97112 NEUROMUSCULAR REEDUCATION: CPT | Performed by: PHYSICAL THERAPIST

## 2023-02-21 NOTE — PROGRESS NOTES
Physical Therapy Daily Treatment Note      Patient: Archana Cisneros   : 1970  Referring practitioner: Maximus Bain MD  Date of Initial Visit: Type: THERAPY  Noted: 2022  Today's Date: 2023    VISIT#: 17          Subjective   Archana Cisneros reports: some left LE pain down from her hip to her foot the other day.  She is frustrated with her back pain.    Objective   See Exercise, Manual, and Modality Logs for complete treatment.       Assessment & Plan     Assessment    Assessment details: Manual and core stabilization progression performed today as focus of treatment for pain relief and improved core stabilization.  Ultrasound performed to decrease soft tissue tightness and pain at end of session today.            Progress per Plan of Care and Progress strengthening /stabilization /functional activity           Timed:  Manual Therapy:    15     mins  43428;  Therapeutic Exercise:    8     mins  76202;     Neuromuscular Funmi:    12    mins  32779;    Therapeutic Activity:          mins  95400;     Gait Training:           mins  79145;     Ultrasound:    10      mins  38064;    Electrical Stimulation:         mins  79059 ( );    Untimed:  Electrical Stimulation:         mins  21188 ( );  Mechanical Traction:         mins  21473;   Dry needling:       Self pay    Timed Treatment:   45   mins   Total Treatment:     45   mins  Alicia Simon PT, DPT, CLT, CIDN  Physical Therapist

## 2023-02-23 ENCOUNTER — TREATMENT (OUTPATIENT)
Dept: PHYSICAL THERAPY | Facility: CLINIC | Age: 53
End: 2023-02-23
Payer: MEDICARE

## 2023-02-23 DIAGNOSIS — S33.5XXA LUMBAR SPRAIN, INITIAL ENCOUNTER: ICD-10-CM

## 2023-02-23 DIAGNOSIS — M54.50 CHRONIC LOW BACK PAIN, UNSPECIFIED BACK PAIN LATERALITY, UNSPECIFIED WHETHER SCIATICA PRESENT: Primary | ICD-10-CM

## 2023-02-23 DIAGNOSIS — G89.29 CHRONIC LOW BACK PAIN, UNSPECIFIED BACK PAIN LATERALITY, UNSPECIFIED WHETHER SCIATICA PRESENT: Primary | ICD-10-CM

## 2023-02-23 DIAGNOSIS — S73.101A SPRAIN OF RIGHT HIP, INITIAL ENCOUNTER: ICD-10-CM

## 2023-02-23 PROCEDURE — 97530 THERAPEUTIC ACTIVITIES: CPT | Performed by: PHYSICAL THERAPIST

## 2023-02-23 PROCEDURE — 97110 THERAPEUTIC EXERCISES: CPT | Performed by: PHYSICAL THERAPIST

## 2023-02-23 NOTE — PROGRESS NOTES
Physical Therapy Daily Treatment Note/Discharge Note      Patient: Archana Cisneros   : 1970  Referring practitioner: Maximus Bain MD  Date of Initial Visit: Type: THERAPY  Noted: 2022  Today's Date: 2023    VISIT#: 18          Subjective Evaluation    Pain  Current pain ratin  At best pain ratin  At worst pain ratin (when coming up from bending over)         Archana Cisneros reports: she did not have any back pain while doing the NuStep and currently today.      Objective   See Exercise, Manual, and Modality Logs for complete treatment.       Assessment & Plan     Assessment    Assessment details: Pt has attended 18 visits for lower back pain with manual, therex, theract, neuromuscular, and modalities performed.  Pt will be discharged with maintenance HEP today.  Education on HEP progression, yoga and mediation benefits and progressive relaxation performance and benefits in order to assist pt with decreased mm tension and stress reduction management tools.            Other  Discharge           Timed:  Manual Therapy:         mins  56174;  Therapeutic Exercise:    20     mins  61171;     Neuromuscular Funmi:        mins  16127;    Therapeutic Activity:     8     mins  30719;     Gait Training:           mins  80311;     Ultrasound:          mins  00360;    Electrical Stimulation:         mins  13685 ( );    Untimed:  Electrical Stimulation:         mins  89387 ( );  Mechanical Traction:         mins  41832;   Dry needling:       Self pay    Timed Treatment:   28   mins   Total Treatment:     28   mins  Alicia Simon PT, DPT, CLT, CIDN  Physical Therapist

## 2023-10-26 ENCOUNTER — OFFICE (AMBULATORY)
Dept: URBAN - METROPOLITAN AREA CLINIC 64 | Facility: CLINIC | Age: 53
End: 2023-10-26

## 2023-10-26 VITALS
WEIGHT: 164 LBS | HEART RATE: 79 BPM | DIASTOLIC BLOOD PRESSURE: 91 MMHG | HEIGHT: 66 IN | SYSTOLIC BLOOD PRESSURE: 134 MMHG

## 2023-10-26 DIAGNOSIS — K59.00 CONSTIPATION, UNSPECIFIED: ICD-10-CM

## 2023-10-26 DIAGNOSIS — R14.0 ABDOMINAL DISTENSION (GASEOUS): ICD-10-CM

## 2023-10-26 PROCEDURE — 99214 OFFICE O/P EST MOD 30 MIN: CPT | Performed by: NURSE PRACTITIONER

## 2024-01-10 ENCOUNTER — OFFICE (AMBULATORY)
Dept: URBAN - METROPOLITAN AREA CLINIC 64 | Facility: CLINIC | Age: 54
End: 2024-01-10
Payer: MEDICARE

## 2024-01-10 VITALS
SYSTOLIC BLOOD PRESSURE: 124 MMHG | HEIGHT: 66 IN | DIASTOLIC BLOOD PRESSURE: 79 MMHG | HEART RATE: 78 BPM | WEIGHT: 163 LBS

## 2024-01-10 DIAGNOSIS — R19.4 CHANGE IN BOWEL HABIT: ICD-10-CM

## 2024-01-10 DIAGNOSIS — Z91.018 ALLERGY TO OTHER FOODS: ICD-10-CM

## 2024-01-10 DIAGNOSIS — R14.0 ABDOMINAL DISTENSION (GASEOUS): ICD-10-CM

## 2024-01-10 PROCEDURE — 99213 OFFICE O/P EST LOW 20 MIN: CPT | Performed by: NURSE PRACTITIONER

## 2024-03-06 ENCOUNTER — TRANSCRIBE ORDERS (OUTPATIENT)
Dept: PHYSICAL THERAPY | Facility: CLINIC | Age: 54
End: 2024-03-06
Payer: MEDICARE

## 2024-03-06 DIAGNOSIS — G89.29 CHRONIC NECK PAIN: Primary | ICD-10-CM

## 2024-03-06 DIAGNOSIS — M54.2 CHRONIC NECK PAIN: Primary | ICD-10-CM

## 2024-03-08 ENCOUNTER — TRANSCRIBE ORDERS (OUTPATIENT)
Dept: ADMINISTRATIVE | Facility: HOSPITAL | Age: 54
End: 2024-03-08
Payer: MEDICARE

## 2024-03-08 ENCOUNTER — HOSPITAL ENCOUNTER (OUTPATIENT)
Dept: GENERAL RADIOLOGY | Facility: HOSPITAL | Age: 54
Discharge: HOME | End: 2024-03-08
Payer: MEDICARE

## 2024-03-08 DIAGNOSIS — G89.29 CHRONIC NECK PAIN: ICD-10-CM

## 2024-03-08 DIAGNOSIS — G89.29 CHRONIC NECK PAIN: Primary | ICD-10-CM

## 2024-03-08 DIAGNOSIS — M54.2 CHRONIC NECK PAIN: Primary | ICD-10-CM

## 2024-03-08 DIAGNOSIS — M54.2 CHRONIC NECK PAIN: ICD-10-CM

## 2024-03-08 PROCEDURE — 72040 X-RAY EXAM NECK SPINE 2-3 VW: CPT

## 2024-03-28 ENCOUNTER — TREATMENT (OUTPATIENT)
Dept: PHYSICAL THERAPY | Facility: CLINIC | Age: 54
End: 2024-03-28
Payer: MEDICARE

## 2024-03-28 DIAGNOSIS — M54.2 CHRONIC NECK PAIN: Primary | ICD-10-CM

## 2024-03-28 DIAGNOSIS — G89.29 CHRONIC NECK PAIN: Primary | ICD-10-CM

## 2024-03-28 PROCEDURE — 97110 THERAPEUTIC EXERCISES: CPT | Performed by: PHYSICAL THERAPIST

## 2024-03-28 PROCEDURE — 97161 PT EVAL LOW COMPLEX 20 MIN: CPT | Performed by: PHYSICAL THERAPIST

## 2024-03-28 PROCEDURE — 97535 SELF CARE MNGMENT TRAINING: CPT | Performed by: PHYSICAL THERAPIST

## 2024-03-28 NOTE — PROGRESS NOTES
Physical Therapy Initial Evaluation and Plan of Care        [address] [place] [address], [address] [place] [address]     Patient: Archana Cisneros   : 1970  Diagnosis/ICD-10 Code:  Chronic neck pain [M54.2, G89.29]  Referring practitioner: Jorge Birmingham MD  Date of Initial Visit: 3/28/2024  Today's Date: 3/28/2024  Patient seen for 1 sessions           Subjective Questionnaire: NDI:48% limited      Subjective Evaluation    History of Present Illness  Mechanism of injury: Pt presents to OP PT with chronic neck pain that flared up in November and feels it may have been caused from the chiropractor.  She has had pain in her neck since her ACDF in .  She is unable to sleep comfortably and has pain with movement.         Patient Occupation: n/a Pain  Current pain ratin  At best pain ratin  At worst pain ratin  Location: neck  Quality: dull ache and tight  Relieving factors: rest (salve)  Aggravating factors: overhead activity, lifting, keyboarding, prolonged positioning, repetitive movement, outstretched reach, sleeping, movement and standing (rotating head)  Progression: no change    Social Support  Lives with: spouse    Hand dominance: right    Diagnostic Tests  X-ray: abnormal (3/8/24 cervical spine XR:  see findings)    Treatments  Previous treatment: chiropractic  Current treatment: chiropractic and physical therapy  Patient Goals  Patient goals for therapy: decreased edema, decreased pain, increased motion, increased strength and independence with ADLs/IADLs         C-spine XR 3/8/24 Findings:  Hardware is present from C6 corpectomy and C5-7 ACDF. No radiographic signs of hardware loosening. There is straightening of the cervical lordosis. No anterolisthesis or retrolisthesis. The posterior elements are intact. No prevertebral soft tissue   swelling. Vertebral body heights are maintained. There is disc space narrowing at the C7-T1 disc space level and to a lesser extent the C4-5 disc space  level. Small nonbridging osteophytes are present at the C4-5 level which have slightly progressed   since 2015. The C2-3 and C3-4 disc space's are preserved.        IMPRESSION:  Impression:  Postsurgical changes from C6 corpectomy and C5-7 ACDF. No radiographic signs of hardware loosening. There is mild degenerative disc disease at the C4-5 and C7-T1 levels.    Precautions: RA, anxiety, depression, asthma, back injury, headaches    Objective        Special Questions  Patient is experiencing disturbed sleep, dizziness and headaches.       Postural Observations  Seated posture: fair  Standing posture: fair  Correction of posture: has no consistent effect    Additional Postural Observation Details  Slumped posture with R > L forward rounded shoulder and forward head position    Palpation   Left   Hypertonic in the cervical paraspinals, scalenes, sternocleidomastoid and upper trapezius.   Tenderness of the cervical paraspinals, levator scapulae, middle trapezius, pectoralis minor, rhomboids, scalenes, sternocleidomastoid, suboccipitals and upper trapezius.   Trigger point to upper trapezius.     Right   Hypertonic in the cervical paraspinals, scalenes and sternocleidomastoid. Tenderness of the cervical paraspinals, levator scapulae, middle trapezius, rhomboids, scalenes, sternocleidomastoid, suboccipitals and upper trapezius.     Tenderness   Cervical Spine   Tenderness in the sternum, left transverse process, left ribs/costal cartilage, left 1st rib, right transverse process, right ribs/costal cartilage and right 1st rib.   No tenderness in the left scapula and right scapula.     Left Shoulder   Tenderness in the clavicle.     Right Shoulder  Tenderness in the clavicle.     Neurological Testing     Sensation   Cervical/Thoracic   Left   Diminished: light touch    Right   Intact: light touch    Comments   Left light touch: C5, C3.     Active Range of Motion   Cervical/Thoracic Spine   Cervical  Subcranial protraction:  WFL   Subcranial retraction: WFL   Flexion: 35 degrees   Extension: 32 degrees with pain  Left lateral flexion: 25 degrees with pain  Right lateral flexion: 15 degrees with pain  Left rotation: 50 degrees   Right rotation: 48 degrees with pain    Thoracic   Flexion: WFL  Extension: Active thoracic extension: limited to 50% with pain  Left rotation: WFL  Right rotation: WFL    Strength/Myotome Testing   Cervical Spine   Neck extension: 4+  Neck flexion: 4+    Left   Neck lateral flexion (C3): 4+    Right   Neck lateral flexion (C3): 4+    Additional Strength Details  Apley's OH and Xbody B full and pain-free      See Exercise, Manual, and Modality Logs for complete treatment.     Assessment & Plan       Assessment  Impairments: abnormal muscle firing, abnormal muscle tone, abnormal or restricted ROM, activity intolerance, impaired physical strength, lacks appropriate home exercise program and pain with function   Functional limitations: carrying objects, lifting, sleeping, pulling, pushing, uncomfortable because of pain, moving in bed, reaching behind back, reaching overhead and unable to perform repetitive tasks   Assessment details: The patient is a 53 y.o. female who presents to physical therapy today for chronic neck pain. Upon initial evaluation, the patient demonstrates the following impairments: cervcial ROM deficits, pain with movement and ttp along multiple areas in upper quarters B. Due to these impairments, the patient is unable to lift, turn head without pain, look down or read without pain. The patient would benefit from skilled PT services to address functional limitations and impairments and to improve patient quality of life.        Goals  Plan Goals: LTG 1: 12 weeks:  The patient will demonstrate 60° of B cervical spine rotation, 45° of B cervical side bending, 45° of cervical flexion, and 45° of cervical extension in order to adequately monitor blind spots while driving and improve ability to  perform activities of daily living.    STATUS:  New  STG 1a: 6 weeks:  The patient will demonstrate 50° of B cervical spine rotation, 40° of B cervical side bending, 40° of cervical flexion, and 40° of cervical extension in order to adequately monitor blind spots while driving and improve ability to perform activities of daily living.       STATUS:  New   LTG 2: 12 weeks:  The patient will report 1/10 pain in order to more easily tolerate activities of daily living and improve sleep quality.    STATUS:  New  STG 2a: 6 weeks:  The patient will report 4/10 pain.    STATUS:  New  LTG 3: 12 weeks:  The patient will report a decrease in frequency and intensity of headaches.    STATUS:  New  STG 3a: 4 weeks:  The patient will report a decrease in L UT trigger point pain and swelling.    STATUS:  New     Plan  Therapy options: will be seen for skilled therapy services  Planned modality interventions: thermotherapy (hydrocollator packs), ultrasound, dry needling, high voltage pulsed current (pain management), TENS and cryotherapy  Planned therapy interventions: body mechanics training, flexibility, functional ROM exercises, home exercise program, joint mobilization, manual therapy, neuromuscular re-education, postural training, soft tissue mobilization, strengthening, stretching and therapeutic activities  Frequency: 1x week  Duration in weeks: 12  Treatment plan discussed with: patient        Visit Diagnoses:    ICD-10-CM ICD-9-CM   1. Chronic neck pain  M54.2 723.1    G89.29 338.29       History # of Personal Factors and/or Comorbidities: HIGH (3+)  Examination of Body System(s): # of elements: LOW (1-2)  Clinical Presentation: EVOLVING  Clinical Decision Making: LOW       Timed:         Manual Therapy:         mins  96015;     Therapeutic Exercise:    13     mins  01015;     Neuromuscular Funmi:        mins  24354;    Therapeutic Activity:          mins  80882;     Gait Training:           mins  78142;     Ultrasound:           mins  57727;    Ionto                                   mins   29718  Self Care                       10     mins   60591  Canalith Repos         mins 12946      Un-Timed:  Electrical Stimulation:         mins  60100 ( );  Dry Needling          mins self-pay  Traction          mins 71793  Low Eval     30     Mins  81923  Mod Eval          Mins  47887  High Eval                            Mins  71864  Re-Eval                               mins  85130    Timed Treatment:   23   mins   Total Treatment:     53   mins    PT SIGNATURE: Alicia Simon PT         Initial Certification  Certification Period: 3/28/2024 thru 6/26/2024  I certify that the therapy services are furnished while this patient is under my care.  The services outlined above are required by this patient, and will be reviewed every 90 days.     PHYSICIAN: Jorge Birmingham MD      DATE:     Please sign and return via fax to [phone].. Thank you, [facility] Physical Therapy.

## 2024-04-04 ENCOUNTER — TREATMENT (OUTPATIENT)
Dept: PHYSICAL THERAPY | Facility: CLINIC | Age: 54
End: 2024-04-04
Payer: MEDICARE

## 2024-04-04 DIAGNOSIS — G89.29 CHRONIC NECK PAIN: Primary | ICD-10-CM

## 2024-04-04 DIAGNOSIS — M54.2 CHRONIC NECK PAIN: Primary | ICD-10-CM

## 2024-04-04 PROCEDURE — 97112 NEUROMUSCULAR REEDUCATION: CPT | Performed by: PHYSICAL THERAPIST

## 2024-04-04 PROCEDURE — 97140 MANUAL THERAPY 1/> REGIONS: CPT | Performed by: PHYSICAL THERAPIST

## 2024-04-04 PROCEDURE — G0283 ELEC STIM OTHER THAN WOUND: HCPCS | Performed by: PHYSICAL THERAPIST

## 2024-04-04 NOTE — PROGRESS NOTES
Physical Therapy Daily Treatment Note   [facility], [address] [place] [address]     Patient: Archana Cisneros   : 1970  Referring practitioner: Jorge Birmingham MD  Diagnosis:      ICD-10-CM ICD-9-CM   1. Chronic neck pain  M54.2 723.1    G89.29 338.29     Date of Initial Visit: Type: THERAPY  Noted: 3/28/2024  Today's Date: 2024    VISIT#: 2          Subjective   Archana Cisneros reports: 4/10 pain level in her neck today with movement.     Objective   See Exercise, Manual, and Modality Logs for complete treatment.       Assessment & Plan       Assessment  Assessment details: Try dry needling next visit if applicable.  Estim and MH trialed for pain relief and decreased tightness.            Progress per Plan of Care and Progress strengthening /stabilization /functional activity           Timed:  Manual Therapy:    10     mins  94234;  Therapeutic Exercise:    8     mins  86030;     Neuromuscular Funmi:    10    mins  98678;    Therapeutic Activity:          mins  36941;     Gait Training:           mins  66663;     Ultrasound:          mins  03127;    Electrical Stimulation:         mins  78211 ( );    Untimed:  Electrical Stimulation:     20    mins  99594 ( );  Mechanical Traction:         mins  04142;   Dry needling:       Self pay    Timed Treatment:   28   mins   Total Treatment:     48   mins  Alicia Simon PT, DPT, CLT, CIDN  Physical Therapist

## 2024-04-11 ENCOUNTER — TREATMENT (OUTPATIENT)
Dept: PHYSICAL THERAPY | Facility: CLINIC | Age: 54
End: 2024-04-11
Payer: MEDICARE

## 2024-04-11 DIAGNOSIS — M54.2 CHRONIC NECK PAIN: Primary | ICD-10-CM

## 2024-04-11 DIAGNOSIS — G89.29 CHRONIC NECK PAIN: Primary | ICD-10-CM

## 2024-04-11 NOTE — PROGRESS NOTES
Physical Therapy Daily Treatment Note  [address] [facility], [address] [place] [address]     Patient: Archana Cisneros   : 1970  Referring practitioner: Jorge Birmingham MD  Diagnosis:      ICD-10-CM ICD-9-CM   1. Chronic neck pain  M54.2 723.1    G89.29 338.29     Date of Initial Visit: Type: THERAPY  Noted: 3/28/2024  Today's Date: 2024    VISIT#: 3          Subjective   Archana Cisneros reports: 4/10 pain level today in her neck with movement.     Objective   See Exercise, Manual, and Modality Logs for complete treatment.       Assessment & Plan       Assessment  Assessment details: Pt had 23 deg of R and 28 deg of L cervical LF and 34 degrees of R and 40 deg of L rotation with 10/10 pain level with pressure on L UT prior to dry needling and 32 and 35 degrees of LF and 55 and 50 degrees o cervical rotation R and L respectively and 8/10 pain with ppt on L UT.  Stretches performed after DN in new ROM.  Reviewed HEP and performed with cues needed for proper form and execution.           Progress per Plan of Care and Progress strengthening /stabilization /functional activity           Timed:  Manual Therapy:    8     mins  37778;  Therapeutic Exercise:         mins  65401;     Neuromuscular Funmi:    8    mins  08378;    Therapeutic Activity:          mins  65117;     Gait Training:           mins  50236;     Ultrasound:          mins  04102;    Electrical Stimulation:         mins  52347 ( );    Untimed:  Electrical Stimulation:         mins  39236 ( );  Mechanical Traction:         mins  25796;   Dry needlin   Self pay    Timed Treatment:   16   mins   Total Treatment:     36   mins  Alicia Simon PT, DPT, CLT, CIDN  Physical Therapist

## 2024-04-22 ENCOUNTER — TREATMENT (OUTPATIENT)
Dept: PHYSICAL THERAPY | Facility: CLINIC | Age: 54
End: 2024-04-22
Payer: MEDICARE

## 2024-04-22 DIAGNOSIS — G89.29 CHRONIC NECK PAIN: Primary | ICD-10-CM

## 2024-04-22 DIAGNOSIS — M54.2 CHRONIC NECK PAIN: Primary | ICD-10-CM

## 2024-04-22 PROCEDURE — 97110 THERAPEUTIC EXERCISES: CPT | Performed by: PHYSICAL THERAPIST

## 2024-04-22 NOTE — PROGRESS NOTES
"Physical Therapy Daily Treatment Note    Patient: Archana Cisneros  : 1970  Referring practitioner: Jorge Birmingham MD  Date of Initial Visit: Type: THERAPY  Noted: 3/28/2024  Today's Date: 2024  Patient seen for 4 sessions      Visit Diagnoses:    ICD-10-CM ICD-9-CM   1. Chronic neck pain  M54.2 723.1    G89.29 338.29       VISIT#: 4    Subjective   Archana Cisneros reports her neck feels tight today. She states that her pain is \"all the way around\" her neck.      Objective     See Exercise, Manual, and Modality Logs for complete treatment.     Patient Education: New exercises.    Assessment/Plan    Added multiple stretches today as well as strengthening of scap stabilizers. Pt was able to complete all exercises but did report some pain in L hand with rhomboid stretch. Pt required cues to perform all stretches and AROM in pain free range. Plan to progress strengthening next session.    Progress per Plan of Care         Timed:         Manual Therapy:    5     mins  22366;     Therapeutic Exercise:    30     mins  39898;     Neuromuscular Funmi:        mins  80166;    Therapeutic Activity:          mins  29657;     Gait Training:           mins  92002;     Ultrasound:          mins  85646;    Ionto                                   mins   06240  Self Care                            mins   92566    Un-Timed:  Electrical Stimulation:         mins  66458 ( );  Traction          mins 91778  Re-Eval                               mins  82885    Timed Treatment:   35   mins   Total Treatment:     35   mins        Ivonne Person PT  Physical Therapist  [place] License #: [id number]  [place] License #: [id number]                       "

## 2024-05-01 ENCOUNTER — OFFICE (AMBULATORY)
Dept: URBAN - METROPOLITAN AREA CLINIC 64 | Facility: CLINIC | Age: 54
End: 2024-05-01

## 2024-05-01 VITALS
HEIGHT: 66 IN | HEART RATE: 71 BPM | DIASTOLIC BLOOD PRESSURE: 79 MMHG | WEIGHT: 160 LBS | SYSTOLIC BLOOD PRESSURE: 118 MMHG

## 2024-05-01 DIAGNOSIS — Z91.018 ALLERGY TO OTHER FOODS: ICD-10-CM

## 2024-05-01 DIAGNOSIS — K59.00 CONSTIPATION, UNSPECIFIED: ICD-10-CM

## 2024-05-01 DIAGNOSIS — R14.0 ABDOMINAL DISTENSION (GASEOUS): ICD-10-CM

## 2024-05-01 DIAGNOSIS — K20.0 EOSINOPHILIC ESOPHAGITIS: ICD-10-CM

## 2024-05-01 PROCEDURE — 99214 OFFICE O/P EST MOD 30 MIN: CPT | Performed by: NURSE PRACTITIONER

## 2024-05-01 RX ORDER — PANTOPRAZOLE 40 MG/1
TABLET, DELAYED RELEASE ORAL
Qty: 90 | Refills: 3 | Status: ACTIVE

## 2024-05-02 ENCOUNTER — TREATMENT (OUTPATIENT)
Dept: PHYSICAL THERAPY | Facility: CLINIC | Age: 54
End: 2024-05-02
Payer: MEDICARE

## 2024-05-02 DIAGNOSIS — M54.2 CHRONIC NECK PAIN: Primary | ICD-10-CM

## 2024-05-02 DIAGNOSIS — G89.29 CHRONIC NECK PAIN: Primary | ICD-10-CM

## 2024-05-02 PROCEDURE — 97110 THERAPEUTIC EXERCISES: CPT | Performed by: PHYSICAL THERAPIST

## 2024-05-02 PROCEDURE — 97140 MANUAL THERAPY 1/> REGIONS: CPT | Performed by: PHYSICAL THERAPIST

## 2024-05-02 NOTE — PROGRESS NOTES
Physical Therapy Daily Treatment Note  [address] [facility], [address] [place] [address]     Patient: Archana Cisneros   : 1970  Referring practitioner: Jorge Birmingham MD  Diagnosis:      ICD-10-CM ICD-9-CM   1. Chronic neck pain  M54.2 723.1    G89.29 338.29     Date of Initial Visit: Type: THERAPY  Noted: 3/28/2024  Today's Date: 2024    VISIT#: 5          Subjective   Archana Cisneros reports: increased swelling in her UT and supraclavicular area (L > R).      Objective   See Exercise, Manual, and Modality Logs for complete treatment.       Assessment & Plan       Assessment  Assessment details: Cues provided to stretch into mild discomfort only.  Manual decreased soft tissue tension on the left side.            Progress per Plan of Care           Timed:  Manual Therapy:    15     mins  57420;  Therapeutic Exercise:    10     mins  30533;     Neuromuscular Funmi:        mins  48349;    Therapeutic Activity:          mins  96166;     Gait Training:           mins  59460;     Ultrasound:          mins  83353;    Electrical Stimulation:         mins  12100 ( );    Untimed:  Electrical Stimulation:         mins  54478 ( );  Mechanical Traction:         mins  15326;   Dry needling:       Self pay    Timed Treatment:   25   mins   Total Treatment:     25   mins  Alicia Simon PT, DPT, CLT, CIDN  Physical Therapist

## 2024-05-09 ENCOUNTER — TREATMENT (OUTPATIENT)
Dept: PHYSICAL THERAPY | Facility: CLINIC | Age: 54
End: 2024-05-09
Payer: MEDICARE

## 2024-05-09 DIAGNOSIS — M54.2 CHRONIC NECK PAIN: Primary | ICD-10-CM

## 2024-05-09 DIAGNOSIS — G89.29 CHRONIC NECK PAIN: Primary | ICD-10-CM

## 2024-05-09 PROCEDURE — 97112 NEUROMUSCULAR REEDUCATION: CPT | Performed by: PHYSICAL THERAPIST

## 2024-05-09 PROCEDURE — 97110 THERAPEUTIC EXERCISES: CPT | Performed by: PHYSICAL THERAPIST

## 2024-05-09 PROCEDURE — 97140 MANUAL THERAPY 1/> REGIONS: CPT | Performed by: PHYSICAL THERAPIST

## 2024-05-16 ENCOUNTER — TREATMENT (OUTPATIENT)
Dept: PHYSICAL THERAPY | Facility: CLINIC | Age: 54
End: 2024-05-16
Payer: MEDICARE

## 2024-05-16 DIAGNOSIS — M54.2 CHRONIC NECK PAIN: Primary | ICD-10-CM

## 2024-05-16 DIAGNOSIS — G89.29 CHRONIC NECK PAIN: Primary | ICD-10-CM

## 2024-05-16 PROCEDURE — 97140 MANUAL THERAPY 1/> REGIONS: CPT | Performed by: PHYSICAL THERAPIST

## 2024-05-16 PROCEDURE — 97112 NEUROMUSCULAR REEDUCATION: CPT | Performed by: PHYSICAL THERAPIST

## 2024-05-16 NOTE — PROGRESS NOTES
Physical Therapy Daily Treatment Note  [address] [facility], [address] [place] [address]     Patient: Archana Cisneros   : 1970  Referring practitioner: Jorge Birmingham MD  Diagnosis:      ICD-10-CM ICD-9-CM   1. Chronic neck pain  M54.2 723.1    G89.29 338.29     Date of Initial Visit: Type: THERAPY  Noted: 3/28/2024  Today's Date: 2024    VISIT#: 7          Subjective   Archana Cisneros reports: her dog sleeps on her pillow and is keeping her up at night and she has more pain on the right UT with LF today.  She states a 4/10 pain on her left and 5/10 pain level on her right UT today.     Objective   See Exercise, Manual, and Modality Logs for complete treatment.       Assessment & Plan       Assessment  Assessment details: Moist heat at start of session for decreased pain and soft tissue tension.  Manual performed to provide further relief and improved mobility.            Progress per Plan of Care and Progress strengthening /stabilization /functional activity           Timed:  Manual Therapy:    8     mins  29740;  Therapeutic Exercise:         mins  63210;     Neuromuscular Funmi:    20     mins  54175;    Therapeutic Activity:          mins  86683;     Gait Training:           mins  95399;     Ultrasound:          mins  03679;    Electrical Stimulation:         mins  37696 ( );    Untimed:  Electrical Stimulation:         mins  61624 ( );  Mechanical Traction:         mins  74598;   Dry needling:       Self pay    Timed Treatment:   28   mins   Total Treatment:     28   mins  Alicia Simon, PT, DPT, CLT, CIDN  Physical Therapist

## 2024-05-23 ENCOUNTER — TREATMENT (OUTPATIENT)
Dept: PHYSICAL THERAPY | Facility: CLINIC | Age: 54
End: 2024-05-23
Payer: MEDICARE

## 2024-05-23 DIAGNOSIS — G89.29 CHRONIC NECK PAIN: Primary | ICD-10-CM

## 2024-05-23 DIAGNOSIS — M54.2 CHRONIC NECK PAIN: Primary | ICD-10-CM

## 2024-05-23 PROCEDURE — 97140 MANUAL THERAPY 1/> REGIONS: CPT | Performed by: PHYSICAL THERAPIST

## 2024-05-23 PROCEDURE — 97110 THERAPEUTIC EXERCISES: CPT | Performed by: PHYSICAL THERAPIST

## 2024-05-23 NOTE — PROGRESS NOTES
Physical Therapy Daily Treatment Note  [address] [facility], [address] [place] [address]     Patient: Archana Cisneros   : 1970  Referring practitioner: Jorge Birmingham MD  Diagnosis:      ICD-10-CM ICD-9-CM   1. Chronic neck pain  M54.2 723.1    G89.29 338.29     Date of Initial Visit: Type: THERAPY  Noted: 3/28/2024  Today's Date: 2024    VISIT#: 8          Subjective   Archana Cisneros reports: her neck is a little better today when she turns her head to the right with 4/10 pain level.     Objective   See Exercise, Manual, and Modality Logs for complete treatment.       Assessment & Plan       Assessment  Assessment details: Pt had improved movement quality after manual today.           Progress per Plan of Care and Progress strengthening /stabilization /functional activity           Timed:  Manual Therapy:    15     mins  80982;  Therapeutic Exercise:    10     mins  13997;     Neuromuscular Funmi:    5    mins  36259;    Therapeutic Activity:          mins  27579;     Gait Training:           mins  66920;     Ultrasound:          mins  49903;    Electrical Stimulation:         mins  37120 ( );    Untimed:  Electrical Stimulation:         mins  69085 ( );  Mechanical Traction:         mins  27923;   Dry needling:       Self pay    Timed Treatment:   30   mins   Total Treatment:     30   mins  Alicia Simon PT, DPT, CLT, CIDN  Physical Therapist   S/P EGD with Dr. Steward, report called to JACEK Cantor

## 2024-06-13 ENCOUNTER — TREATMENT (OUTPATIENT)
Dept: PHYSICAL THERAPY | Facility: CLINIC | Age: 54
End: 2024-06-13
Payer: MEDICARE

## 2024-06-13 DIAGNOSIS — G89.29 CHRONIC NECK PAIN: Primary | ICD-10-CM

## 2024-06-13 DIAGNOSIS — M54.2 CHRONIC NECK PAIN: Primary | ICD-10-CM

## 2024-06-13 NOTE — PROGRESS NOTES
Physical Therapy Daily Treatment Note  [address] [facility], [address] [place] [address]     Patient: Archana Cisneros   : 1970  Referring practitioner: Jorge Birmingham MD  Diagnosis:      ICD-10-CM ICD-9-CM   1. Chronic neck pain  M54.2 723.1    G89.29 338.29     Date of Initial Visit: Type: THERAPY  Noted: 3/28/2024  Today's Date: 2024    VISIT#: 9          Subjective   Archana Cisneros reports: she gets temporary relief with PT sessions.     Objective   See Exercise, Manual, and Modality Logs for complete treatment.       Assessment & Plan       Assessment  Assessment details: Manual performed to decrease soft tissue tightness and pain in SCM, scalenes and cervical musculature with pt reporting 5/10 pain level upon leaving, which was down from 9/10 at start of session.           Progress per Plan of Care and Progress strengthening /stabilization /functional activity           Timed:  Manual Therapy:    20     mins  70109;  Therapeutic Exercise:         mins  56485;     Neuromuscular Funmi:    8    mins  74384;    Therapeutic Activity:          mins  87167;     Gait Training:           mins  95433;     Ultrasound:          mins  71676;    Electrical Stimulation:         mins  26487 ( );    Untimed:  Electrical Stimulation:         mins  77662 ( );  Mechanical Traction:         mins  77125;   Dry needling:       Self pay    Timed Treatment:   28   mins   Total Treatment:     28   mins  Alicia Simon, PT, DPT, CLT, CIDN  Physical Therapist

## 2024-06-18 ENCOUNTER — TREATMENT (OUTPATIENT)
Dept: PHYSICAL THERAPY | Facility: CLINIC | Age: 54
End: 2024-06-18
Payer: MEDICARE

## 2024-06-18 DIAGNOSIS — M54.2 CHRONIC NECK PAIN: Primary | ICD-10-CM

## 2024-06-18 DIAGNOSIS — G89.29 CHRONIC NECK PAIN: Primary | ICD-10-CM

## 2024-06-18 NOTE — PROGRESS NOTES
Physical Therapy Daily Treatment Note  [address] [facility], [address] [place] [address]     Patient: Archana Cisneros   : 1970  Referring practitioner: Jorge Birmingham MD  Diagnosis:      ICD-10-CM ICD-9-CM   1. Chronic neck pain  M54.2 723.1    G89.29 338.29     Date of Initial Visit: Type: THERAPY  Noted: 3/28/2024  Today's Date: 2024    VISIT#: 10          Subjective   Archana Cisneros reports: 6/10 pain level today in her neck with bending to the side on the same side.      Objective   See Exercise, Manual, and Modality Logs for complete treatment.       Assessment & Plan       Assessment  Assessment details: Manual performed for decrease pain and soft tissue tightness.            Progress per Plan of Care and Progress strengthening /stabilization /functional activity           Timed:  Manual Therapy:    8     mins  58105;  Therapeutic Exercise:    15     mins  14413;     Neuromuscular Funmi:        mins  95456;    Therapeutic Activity:          mins  61885;     Gait Training:           mins  94681;     Ultrasound:          mins  04791;    Electrical Stimulation:         mins  25690 ( );    Untimed:  Electrical Stimulation:         mins  31040 ( );  Mechanical Traction:         mins  28934;   Dry needling:       Self pay    Timed Treatment:   23   mins   Total Treatment:     23   mins  Alicia Simon PT, DPT, CLT, CIDN  Physical Therapist

## 2024-06-27 ENCOUNTER — TREATMENT (OUTPATIENT)
Dept: PHYSICAL THERAPY | Facility: CLINIC | Age: 54
End: 2024-06-27
Payer: MEDICARE

## 2024-06-27 DIAGNOSIS — M54.2 CHRONIC NECK PAIN: Primary | ICD-10-CM

## 2024-06-27 DIAGNOSIS — G89.29 CHRONIC NECK PAIN: Primary | ICD-10-CM

## 2024-06-27 NOTE — PROGRESS NOTES
Physical Therapy Re Certification Of Plan of Care  Patient: Archana Cisneros   : 1970  Diagnosis/ICD-10 Code:  Chronic neck pain [M54.2, G89.29]  Referring practitioner: Jorge Birmingham MD  Date of Initial Visit: Type: THERAPY  Noted: 3/28/2024  Today's Date: 2024  Patient seen for 11 sessions         Visit Diagnoses:    ICD-10-CM ICD-9-CM   1. Chronic neck pain  M54.2 723.1    G89.29 338.29         Archana Cisneros reports: she went back to the chiropractor and finished her contracted visits.  She states her swelling is better and some pain in her L UT with LF R and she states it catches twice a day now and mainly when she looks down.    Subjective Questionnaire: NDI:52% limited  Clinical Progress: temporarily improved, but has catches and pain that are worse since going to the chiropractor  Home Program Compliance: Yes  Treatment has included: therapeutic exercise, neuromuscular re-education, manual therapy, therapeutic activity, electrical stimulation, and moist heat      Subjective Evaluation    Pain  Current pain ratin  At best pain ratin  At worst pain rating: 10  Quality: sharp and tight           Objective          Active Range of Motion   Cervical/Thoracic Spine   Cervical    Flexion: 25 degrees   Extension: 45 degrees   Left lateral flexion: 34 degrees   Right lateral flexion: 30 degrees   Left rotation: 42 degrees   Right rotation: 60 degrees           Assessment & Plan       Assessment  Impairments: abnormal muscle firing, abnormal muscle tone, abnormal or restricted ROM, activity intolerance, impaired physical strength, lacks appropriate home exercise program and pain with function   Functional limitations: carrying objects, lifting, sleeping, pulling, pushing, uncomfortable because of pain, moving in bed, stooping, reaching behind back, reaching overhead and unable to perform repetitive tasks   Assessment details: Pt has met goals as noted below and has temporary relief and  improved movement quality with therapy, but still has painful catching.  She has decreased popping and swelling/inflammation.  She has increased B LF, R rotation and extension in her cervical spine and decreased pain overall.      Goals  Plan Goals: LTG 1: 12 weeks:  The patient will demonstrate 60° of B cervical spine rotation, 45° of B cervical side bending, 45° of cervical flexion, and 45° of cervical extension in order to adequately monitor blind spots while driving and improve ability to perform activities of daily living.    STATUS:  New  STG 1a: 6 weeks:  The patient will demonstrate 50° of B cervical spine rotation, 40° of B cervical side bending, 40° of cervical flexion, and 40° of cervical extension in order to adequately monitor blind spots while driving and improve ability to perform activities of daily living.       STATUS:  MET for extension   LTG 2: 12 weeks:  The patient will report 1/10 pain in order to more easily tolerate activities of daily living and improve sleep quality.    STATUS:  IN PROGRESS  STG 2a: 6 weeks:  The patient will report 4/10 pain.    STATUS:  NOT MET  LTG 3: 12 weeks:  The patient will report a decrease in frequency and intensity of headaches.    STATUS:  MET  STG 3a: 4 weeks:  The patient will report a decrease in L UT trigger point pain and swelling.    STATUS:  MET      Plan  Therapy options: will be seen for skilled therapy services  Planned modality interventions: cryotherapy, dry needling, TENS, thermotherapy (hydrocollator packs), ultrasound and high voltage pulsed current (pain management)  Planned therapy interventions: manual therapy, neuromuscular re-education, postural training, soft tissue mobilization, strengthening, stretching, therapeutic activities, joint mobilization, body mechanics training, flexibility, functional ROM exercises and home exercise program  Frequency: 1x week  Duration in weeks: 12  Treatment plan discussed with:  patient           Recommendations: Continue as planned  Timeframe: 3 months  Prognosis to achieve goals: fair      Timed:         Manual Therapy:    10     mins  10301;     Therapeutic Exercise:         mins  95480;     Neuromuscular Funmi:    13    mins  35849;    Therapeutic Activity:          mins  91359;     Gait Training:           mins  51438;     Ultrasound:          mins  76222;    Ionto                                   mins   24616  Self Care                            mins   54368    Un-Timed:  Electrical Stimulation:         mins  53072 (MC );  Dry Needling          mins self-pay  Traction          mins 43335  Re-Eval                               mins  05118  Canalith Repos         mins 41645    Timed Treatment:   23   mins   Total Treatment:     23   mins          PT: Alicia Simon PT       Electronically signed by Alicia Simon PT, 06/27/24, 8:11 AM EDT    Certification Period: 6/27/2024 thru 9/24/2024  I certify that the therapy services are furnished while this patient is under my care.  The services outlined above are required by this patient, and will be reviewed every 90 days.         Physician Signature:__________________________________________________    PHYSICIAN: Jorge Birmingham MD  NPI: [id number]                                      DATE:  :     Please sign and return via fax to [email or web address] . Thank you, [facility]

## 2024-07-03 ENCOUNTER — TREATMENT (OUTPATIENT)
Dept: PHYSICAL THERAPY | Facility: CLINIC | Age: 54
End: 2024-07-03
Payer: MEDICARE

## 2024-07-03 DIAGNOSIS — M54.2 CHRONIC NECK PAIN: Primary | ICD-10-CM

## 2024-07-03 DIAGNOSIS — G89.29 CHRONIC NECK PAIN: Primary | ICD-10-CM

## 2024-07-03 NOTE — PROGRESS NOTES
"Physical Therapy Daily Treatment Note  [address] [facility], [address] [place] [address]     Patient: Archana Cisneros   : 1970  Referring practitioner: Jorge Birmingham MD  Diagnosis:      ICD-10-CM ICD-9-CM   1. Chronic neck pain  M54.2 723.1    G89.29 338.29     Date of Initial Visit: Type: THERAPY  Noted: 3/28/2024  Today's Date: 7/3/2024    VISIT#: 12          Subjective   Archana Cisneros reports: she is \"stuck\" today with pain on the left side of her neck.     Objective   See Exercise, Manual, and Modality Logs for complete treatment.       Assessment & Plan       Assessment  Assessment details: Pt has trigger points and tightness in L scalenes and UT with elevated 1st rib.  Manual and MH and stretches with education today to decrease soft tissue tension and pain.         Progress per Plan of Care and Progress strengthening /stabilization /functional activity           Timed:  Manual Therapy:    15     mins  90679;  Therapeutic Exercise:     15    mins  71213;     Neuromuscular Funmi:    15    mins  07195;    Therapeutic Activity:          mins  39085;     Gait Training:           mins  48500;     Ultrasound:          mins  73574;    Electrical Stimulation:         mins  07806 ( );  Self-care:  8       mins  37880    Untimed:  Electrical Stimulation:         mins  03372 ( );  Mechanical Traction:         mins  41483;   Dry needling:       Self pay    Timed Treatment:   53   mins   Total Treatment:     53   mins  Alicia Simon PT, DPT, CLT, CIDN  Physical Therapist  "

## 2024-07-08 ENCOUNTER — TREATMENT (OUTPATIENT)
Dept: PHYSICAL THERAPY | Facility: CLINIC | Age: 54
End: 2024-07-08
Payer: MEDICARE

## 2024-07-08 DIAGNOSIS — G89.29 CHRONIC NECK PAIN: Primary | ICD-10-CM

## 2024-07-08 DIAGNOSIS — M54.2 CHRONIC NECK PAIN: Primary | ICD-10-CM

## 2024-07-08 NOTE — PROGRESS NOTES
Physical Therapy Daily Treatment Note  [address] [facility], [address] [place] [address]     Patient: Arcahna Cisneros   : 1970  Referring practitioner: Jorge Birmingham MD  Diagnosis:      ICD-10-CM ICD-9-CM   1. Chronic neck pain  M54.2 723.1    G89.29 338.29     Date of Initial Visit: Type: THERAPY  Noted: 3/28/2024  Today's Date: 2024    VISIT#: 13          Subjective   Archana Cisneros reports: she felt better after last session and she is more aware of her head positioning when looking down and that helps take away the sharp locked sensation in her neck.     Objective   See Exercise, Manual, and Modality Logs for complete treatment.       Assessment & Plan       Assessment  Assessment details: Pt tolerated session with decreased pain and mm tension noted afterwards.            Progress per Plan of Care and Progress strengthening /stabilization /functional activity           Timed:  Manual Therapy:    8     mins  13752;  Therapeutic Exercise:    10     mins  39034;     Neuromuscular Funmi:    10    mins  51638;    Therapeutic Activity:          mins  32304;     Gait Training:           mins  36299;     Ultrasound:          mins  53183;    Electrical Stimulation:         mins  34571 ( );    Untimed:  Electrical Stimulation:         mins  71966 ( );  Mechanical Traction:         mins  23389;   Dry needling:       Self pay    Timed Treatment:   28   mins   Total Treatment:     28   mins  Alicia Simon PT, DPT, CLT, CIDN  Physical Therapist

## 2024-07-15 ENCOUNTER — TREATMENT (OUTPATIENT)
Dept: PHYSICAL THERAPY | Facility: CLINIC | Age: 54
End: 2024-07-15
Payer: MEDICARE

## 2024-07-15 DIAGNOSIS — M54.2 CHRONIC NECK PAIN: Primary | ICD-10-CM

## 2024-07-15 DIAGNOSIS — G89.29 CHRONIC NECK PAIN: Primary | ICD-10-CM

## 2024-07-15 NOTE — PROGRESS NOTES
Physical Therapy Daily Treatment Note  [address] [facility], [address] [place] [address]     Patient: Archana Cisneros   : 1970  Referring practitioner: Jorge Birmingham MD  Diagnosis:      ICD-10-CM ICD-9-CM   1. Chronic neck pain  M54.2 723.1    G89.29 338.29     Date of Initial Visit: Type: THERAPY  Noted: 3/28/2024  Today's Date: 7/15/2024    VISIT#: 14          Subjective   Archana Cisneros reports: she danced yesterday and her neck is hurting more today on the left side.  She still has the catching in her neck when looking down.     Objective   See Exercise, Manual, and Modality Logs for complete treatment.       Assessment & Plan       Assessment  Assessment details: Pt left with improved motion and decreased pain after manual today.           Progress per Plan of Care and Progress strengthening /stabilization /functional activity           Timed:  Manual Therapy:    10     mins  97536;  Therapeutic Exercise:         mins  33500;     Neuromuscular Funmi:    15    mins  51911;    Therapeutic Activity:          mins  56041;     Gait Training:           mins  44323;     Ultrasound:          mins  58693;    Electrical Stimulation:         mins  03590 ( );    Untimed:  Electrical Stimulation:         mins  26637 ( );  Mechanical Traction:         mins  82297;   Dry needling:       Self pay    Timed Treatment:   25   mins   Total Treatment:     25   mins  Alicia Simon PT, DPT, CLT, CIDN  Physical Therapist

## 2024-07-22 ENCOUNTER — TREATMENT (OUTPATIENT)
Dept: PHYSICAL THERAPY | Facility: CLINIC | Age: 54
End: 2024-07-22
Payer: MEDICARE

## 2024-07-22 DIAGNOSIS — G89.29 CHRONIC NECK PAIN: Primary | ICD-10-CM

## 2024-07-22 DIAGNOSIS — M54.2 CHRONIC NECK PAIN: Primary | ICD-10-CM

## 2024-07-22 PROCEDURE — 97140 MANUAL THERAPY 1/> REGIONS: CPT | Performed by: PHYSICAL THERAPIST

## 2024-07-22 PROCEDURE — 97110 THERAPEUTIC EXERCISES: CPT | Performed by: PHYSICAL THERAPIST

## 2024-07-22 NOTE — PROGRESS NOTES
Physical Therapy Daily Treatment Note  [address] [facility], [address] [place] [address]     Patient: Archana Cisneros   : 1970  Referring practitioner: Jorge Birmingham MD  Diagnosis:      ICD-10-CM ICD-9-CM   1. Chronic neck pain  M54.2 723.1    G89.29 338.29     Date of Initial Visit: Type: THERAPY  Noted: 3/28/2024  Today's Date: 2024    VISIT#: 15          Subjective   Archana Cisneros reports: she is in pain in her left UT area and she has pain with movement.  Pain level today is 8/10 with movement and she is not getting much sleep due to this.      Objective   See Exercise, Manual, and Modality Logs for complete treatment.       Assessment & Plan       Assessment  Assessment details: Pt had some relief after manual and ice massage today, but still has some swelling and mm spasms/trigger points in L UT and levator scap.            Progress per Plan of Care and Progress strengthening /stabilization /functional activity           Timed:  Manual Therapy:    15     mins  25991;  Therapeutic Exercise:    8     mins  83145;     Neuromuscular Funmi:        mins  61195;    Therapeutic Activity:          mins  67202;     Gait Training:           mins  06889;     Ultrasound:          mins  14813;    Electrical Stimulation:         mins  94217 ( );    Untimed:  Electrical Stimulation:         mins  60244 ( );  Mechanical Traction:         mins  98087;   Dry needling:       Self pay    Timed Treatment:   23   mins   Total Treatment:     23   mins  Alicia Simon PT, DPT, CLT, CIDN  Physical Therapist

## 2024-07-30 ENCOUNTER — TREATMENT (OUTPATIENT)
Dept: PHYSICAL THERAPY | Facility: CLINIC | Age: 54
End: 2024-07-30
Payer: MEDICARE

## 2024-07-30 DIAGNOSIS — G89.29 CHRONIC NECK PAIN: Primary | ICD-10-CM

## 2024-07-30 DIAGNOSIS — M54.2 CHRONIC NECK PAIN: Primary | ICD-10-CM

## 2024-07-30 PROCEDURE — 97530 THERAPEUTIC ACTIVITIES: CPT | Performed by: PHYSICAL THERAPIST

## 2024-07-30 PROCEDURE — 97112 NEUROMUSCULAR REEDUCATION: CPT | Performed by: PHYSICAL THERAPIST

## 2024-07-30 PROCEDURE — 97140 MANUAL THERAPY 1/> REGIONS: CPT | Performed by: PHYSICAL THERAPIST

## 2024-07-30 NOTE — PROGRESS NOTES
Physical Therapy Daily Treatment Note   [facility], [address] [place] [address]     Patient: Archana Cisneros   : 1970  Referring practitioner: Jorge Birmingham MD  Diagnosis:      ICD-10-CM ICD-9-CM   1. Chronic neck pain  M54.2 723.1    G89.29 338.29     Date of Initial Visit: Type: THERAPY  Noted: 3/28/2024  Today's Date: 2024    VISIT#: 16          Subjective   Archana Cisneros reports: she continues to get mm spasms in her L shoulder/neck area with reaching movements and is 6/10 pain level today.     Objective   See Exercise, Manual, and Modality Logs for complete treatment.       Assessment & Plan       Assessment  Assessment details: Added 1st rib self mobilization with handout provided.           Progress per Plan of Care and Progress strengthening /stabilization /functional activity           Timed:  Manual Therapy:  15       mins  30718;  Therapeutic Exercise:         mins  45928;     Neuromuscular Funmi:    8    mins  94278;    Therapeutic Activity:    15      mins  79131;     Gait Training:           mins  96773;     Ultrasound:          mins  06910;    Electrical Stimulation:         mins  55636 ( );    Untimed:  Electrical Stimulation:         mins  69906 ( );  Mechanical Traction:         mins  86836;   Dry needling:       Self pay    Timed Treatment:   38   mins   Total Treatment:     38   mins  Alicia Simon PT, DPT, CLT, CIDN  Physical Therapist

## 2024-08-06 ENCOUNTER — TREATMENT (OUTPATIENT)
Dept: PHYSICAL THERAPY | Facility: CLINIC | Age: 54
End: 2024-08-06
Payer: MEDICARE

## 2024-08-06 DIAGNOSIS — G89.29 CHRONIC NECK PAIN: Primary | ICD-10-CM

## 2024-08-06 DIAGNOSIS — M54.2 CHRONIC NECK PAIN: Primary | ICD-10-CM

## 2024-08-06 PROCEDURE — 97110 THERAPEUTIC EXERCISES: CPT | Performed by: PHYSICAL THERAPIST

## 2024-08-06 PROCEDURE — 97140 MANUAL THERAPY 1/> REGIONS: CPT | Performed by: PHYSICAL THERAPIST

## 2024-08-06 NOTE — PROGRESS NOTES
Physical Therapy Daily Treatment Note   [facility], [address] [place] [address]     Patient: Archana Cisneros   : 1970  Referring practitioner: Jorge Birmingham MD  Diagnosis:      ICD-10-CM ICD-9-CM   1. Chronic neck pain  M54.2 723.1    G89.29 338.29     Date of Initial Visit: Type: THERAPY  Noted: 3/28/2024  Today's Date: 2024    VISIT#: 17          Subjective   Archana Cisneros reports: 10 pain level today with movement as she just woke up.      Objective   See Exercise, Manual, and Modality Logs for complete treatment.     Access Code: [email or web address]  URL: [email or web address]  Date: 2024  Prepared by: Alicia Simon    Exercises  - Sternal Lift  - 1 x daily - 7 x weekly - 3 sets - 10 reps - 10 hold  - Seated Chin Tuck with Neck Elongation  - 1 x daily - 7 x weekly - 3 sets - 10 reps - 10 hold  - Seated Scapular Retraction  - 1 x daily - 7 x weekly - 3 sets - 10 reps - 10 hold  - Seated Thoracic Lumbar Extension with Pectoralis Stretch  - 1 x daily - 7 x weekly - 3 sets - 10 reps - 10 hold  - Seated Upper Trapezius Stretch  - 1 x daily - 7 x weekly - 3 sets - 30 hold  - Seated Levator Scapulae Stretch  - 1 x daily - 7 x weekly - 3 sets - 30 hold  - Standing Bilateral Low Shoulder Row with Anchored Resistance  - 1 x daily - 7 x weekly - 3 sets - 10 reps - 2 hold    Patient Education  - Managing Neck Pain  - Office Posture    Assessment & Plan       Assessment  Assessment details: Gave handout with rows and levator scap stretches to complete pt's maintenance HEP.  Will discharge next session.           Anticipate DC next Visit           Timed:  Manual Therapy:    8     mins  71737;  Therapeutic Exercise:    15     mins  79439;     Neuromuscular Funmi:    7    mins  81604;    Therapeutic Activity:          mins  79064;     Gait Training:           mins  30901;     Ultrasound:          mins  19327;    Electrical Stimulation:         mins  86194 ( );    Untimed:  Electrical  Stimulation:         mins  50004 ( );  Mechanical Traction:         mins  65587;   Dry needling:       Self pay    Timed Treatment:   30   mins   Total Treatment:     30   mins  Alicia Simon PT, ZACHARYT, CLT, JARVIS  Physical Therapist

## 2024-08-13 ENCOUNTER — TREATMENT (OUTPATIENT)
Dept: PHYSICAL THERAPY | Facility: CLINIC | Age: 54
End: 2024-08-13
Payer: MEDICARE

## 2024-08-13 DIAGNOSIS — M54.2 CHRONIC NECK PAIN: Primary | ICD-10-CM

## 2024-08-13 DIAGNOSIS — G89.29 CHRONIC NECK PAIN: Primary | ICD-10-CM

## 2024-08-13 NOTE — PROGRESS NOTES
Physical Therapy Daily Treatment Note/Discharge Note  [address] [facility], [address] [place] [address]     Patient: Archana Cisneros   : 1970  Referring practitioner: Jorge Birmingham MD  Diagnosis:      ICD-10-CM ICD-9-CM   1. Chronic neck pain  M54.2 723.1    G89.29 338.29     Date of Initial Visit: Type: THERAPY  Noted: 3/28/2024  Today's Date: 2024    VISIT#: 18          Subjective   Archana Cisneros reports: she fell down the stairs  night and is sore all over today.  She reports 8/10 pain level with L cervical rotation and 0/10 pain level at rest.      Objective          Active Range of Motion   Cervical/Thoracic Spine   Cervical    Flexion: 30 degrees with pain  Extension: 60 degrees   Left lateral flexion: 35 degrees with pain  Right lateral flexion: 30 degrees with pain  Left rotation: 55 degrees   Right rotation: 50 degrees       See Exercise, Manual, and Modality Logs for complete treatment.     NDI = 50% limited (was 48% limited at initial eval)    Assessment & Plan       Assessment  Assessment details: Pt has met goals as indicated below.  Pt will be discharged today with maintenance HEP.  She feels she is 3% overall improved, noting she has more motion and decreased pain with cervical flexion.  She reports no more headaches.      Goals  Plan Goals:  LTG 1: 12 weeks:  The patient will demonstrate 60° of B cervical spine rotation, 45° of B cervical side bending, 45° of cervical flexion, and 45° of cervical extension in order to adequately monitor blind spots while driving and improve ability to perform activities of daily living.                          STATUS:  PROGRESSED  STG 1a: 6 weeks:  The patient will demonstrate 50° of B cervical spine rotation, 40° of B cervical side bending, 40° of cervical flexion, and 40° of cervical extension in order to adequately monitor blind spots while driving and improve ability to perform activities of daily living.                                       STATUS:  MET for extension            LTG 2: 12 weeks:  The patient will report 1/10 pain in order to more easily tolerate activities of daily living and improve sleep quality.                          STATUS:  NOT MET  STG 2a: 6 weeks:  The patient will report 4/10 pain.                          STATUS:  NOT MET  LTG 3: 12 weeks:  The patient will report a decrease in frequency and intensity of headaches.                          STATUS:  MET  STG 3a: 4 weeks:  The patient will report a decrease in L UT trigger point pain and swelling.                          STATUS:  MET      Plan  Therapy options: will not be seen for skilled therapy services          Other           Timed:  Manual Therapy:    13     mins  43074;  Therapeutic Exercise:   15      mins  71899;     Neuromuscular Funmi:        mins  23499;    Therapeutic Activity:          mins  59957;     Gait Training:           mins  04918;     Ultrasound:          mins  48037;    Electrical Stimulation:         mins  36386 ( );    Untimed:  Electrical Stimulation:         mins  82389 ( );  Mechanical Traction:         mins  19377;   Dry needling:       Self pay    Timed Treatment:   28   mins   Total Treatment:     28   mins  Alicia Simon PT, DPT, CLT, NUBIAN  Physical Therapist

## 2024-08-14 ENCOUNTER — PATIENT ROUNDING (BHMG ONLY) (OUTPATIENT)
Dept: URGENT CARE | Facility: CLINIC | Age: 54
End: 2024-08-14
Payer: MEDICARE

## 2024-08-14 NOTE — ED NOTES
Thank you for letting us care for you in your recent visit to our urgent care center. We would love to hear about your experience with us. Was this the first time you have visited our location?    We’re always looking for ways to make our patients’ experiences even better. Do you have any recommendations on ways we may improve?     I appreciate you taking the time to respond. Please be on the lookout for a survey about your recent visit from [email or web address] via text or email. We would greatly appreciate if you could fill that out and turn it back in. We want your voice to be heard and we value your feedback.   Thank you for choosing [facility] for your healthcare needs.

## 2024-08-27 ENCOUNTER — OFFICE VISIT (OUTPATIENT)
Dept: PODIATRY | Facility: CLINIC | Age: 54
End: 2024-08-27
Payer: MEDICARE

## 2024-08-27 VITALS — HEIGHT: 66 IN | RESPIRATION RATE: 20 BRPM | BODY MASS INDEX: 25.55 KG/M2 | WEIGHT: 159 LBS

## 2024-08-27 DIAGNOSIS — M79.671 RIGHT FOOT PAIN: Primary | ICD-10-CM

## 2024-08-27 DIAGNOSIS — S92.514A CLOSED NONDISPLACED FRACTURE OF PROXIMAL PHALANX OF LESSER TOE OF RIGHT FOOT, INITIAL ENCOUNTER: ICD-10-CM

## 2024-08-27 PROCEDURE — 1159F MED LIST DOCD IN RCRD: CPT | Performed by: PODIATRIST

## 2024-08-27 PROCEDURE — 1160F RVW MEDS BY RX/DR IN RCRD: CPT | Performed by: PODIATRIST

## 2024-08-27 PROCEDURE — 99203 OFFICE O/P NEW LOW 30 MIN: CPT | Performed by: PODIATRIST
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
## 2024-09-24 ENCOUNTER — OFFICE VISIT (OUTPATIENT)
Dept: PODIATRY | Facility: CLINIC | Age: 54
End: 2024-09-24
Payer: MEDICARE

## 2024-09-24 VITALS — HEIGHT: 66 IN | WEIGHT: 159 LBS | RESPIRATION RATE: 18 BRPM | BODY MASS INDEX: 25.55 KG/M2

## 2024-09-24 DIAGNOSIS — M79.671 RIGHT FOOT PAIN: Primary | ICD-10-CM

## 2024-09-24 DIAGNOSIS — S92.514D CLOSED NONDISPLACED FRACTURE OF PROXIMAL PHALANX OF LESSER TOE OF RIGHT FOOT WITH ROUTINE HEALING, SUBSEQUENT ENCOUNTER: ICD-10-CM

## 2024-09-24 PROCEDURE — 1160F RVW MEDS BY RX/DR IN RCRD: CPT | Performed by: PODIATRIST

## 2024-09-24 PROCEDURE — 1159F MED LIST DOCD IN RCRD: CPT | Performed by: PODIATRIST

## 2024-09-24 PROCEDURE — 99213 OFFICE O/P EST LOW 20 MIN: CPT | Performed by: PODIATRIST

## 2024-10-22 ENCOUNTER — HOSPITAL ENCOUNTER (OUTPATIENT)
Facility: HOSPITAL | Age: 54
Discharge: HOME | End: 2024-10-22
Attending: EMERGENCY MEDICINE | Admitting: EMERGENCY MEDICINE
Payer: MEDICARE

## 2024-10-22 ENCOUNTER — APPOINTMENT (OUTPATIENT)
Dept: GENERAL RADIOLOGY | Facility: HOSPITAL | Age: 54
End: 2024-10-22
Payer: MEDICARE

## 2024-10-22 VITALS
RESPIRATION RATE: 16 BRPM | WEIGHT: 159 LBS | BODY MASS INDEX: 25.55 KG/M2 | SYSTOLIC BLOOD PRESSURE: 132 MMHG | HEART RATE: 86 BPM | TEMPERATURE: 98.9 F | HEIGHT: 66 IN | DIASTOLIC BLOOD PRESSURE: 78 MMHG | OXYGEN SATURATION: 98 %

## 2024-10-22 DIAGNOSIS — S20.212A CONTUSION OF RIB ON LEFT SIDE, INITIAL ENCOUNTER: Primary | ICD-10-CM
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
## 2024-10-22 PROCEDURE — G0463 HOSPITAL OUTPT CLINIC VISIT: HCPCS | Performed by: NURSE PRACTITIONER

## 2024-10-22 PROCEDURE — 71046 X-RAY EXAM CHEST 2 VIEWS: CPT

## 2024-10-22 PROCEDURE — 99213 OFFICE O/P EST LOW 20 MIN: CPT | Performed by: NURSE PRACTITIONER
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
## 2024-10-22 NOTE — DISCHARGE INSTRUCTIONS
Call for a follow up appointment with your primary care for further evaluation and treatment.     You can get lidocaine, biofreeze patches over the counter and use per manufactoring directions.     Make sure you are taking deep breath and coughing to avoid pneumonia.     Tylenol/Motrin as needed for pain/fevers    Make sure patient is drinking plenty of fluids.    Return for any new or worsening symptoms.

## 2024-11-05 ENCOUNTER — OFFICE VISIT (OUTPATIENT)
Dept: PODIATRY | Facility: CLINIC | Age: 54
End: 2024-11-05
Payer: MEDICARE

## 2024-11-05 VITALS — WEIGHT: 159 LBS | HEIGHT: 66 IN | BODY MASS INDEX: 25.55 KG/M2 | RESPIRATION RATE: 20 BRPM

## 2024-11-05 DIAGNOSIS — M79.671 RIGHT FOOT PAIN: Primary | ICD-10-CM

## 2024-11-05 DIAGNOSIS — S92.514G CLOSED NONDISPLACED FRACTURE OF PROXIMAL PHALANX OF LESSER TOE OF RIGHT FOOT WITH DELAYED HEALING, SUBSEQUENT ENCOUNTER: ICD-10-CM

## 2024-11-06 ENCOUNTER — OFFICE (AMBULATORY)
Age: 54
End: 2024-11-06
Payer: MEDICARE

## 2024-11-06 ENCOUNTER — OFFICE (AMBULATORY)
Dept: URBAN - METROPOLITAN AREA CLINIC 64 | Facility: CLINIC | Age: 54
End: 2024-11-06
Payer: MEDICARE

## 2024-11-06 VITALS
HEIGHT: 66 IN | DIASTOLIC BLOOD PRESSURE: 69 MMHG | DIASTOLIC BLOOD PRESSURE: 69 MMHG | WEIGHT: 158 LBS | HEART RATE: 90 BPM | HEART RATE: 90 BPM | HEIGHT: 66 IN | WEIGHT: 158 LBS | DIASTOLIC BLOOD PRESSURE: 69 MMHG | DIASTOLIC BLOOD PRESSURE: 69 MMHG | DIASTOLIC BLOOD PRESSURE: 69 MMHG | SYSTOLIC BLOOD PRESSURE: 107 MMHG | HEART RATE: 90 BPM | WEIGHT: 158 LBS | DIASTOLIC BLOOD PRESSURE: 69 MMHG | SYSTOLIC BLOOD PRESSURE: 107 MMHG | DIASTOLIC BLOOD PRESSURE: 69 MMHG | HEART RATE: 90 BPM | HEIGHT: 66 IN | HEART RATE: 90 BPM | WEIGHT: 158 LBS | HEIGHT: 66 IN | SYSTOLIC BLOOD PRESSURE: 107 MMHG | HEART RATE: 90 BPM | HEART RATE: 90 BPM | HEIGHT: 66 IN | WEIGHT: 158 LBS | HEIGHT: 66 IN | WEIGHT: 158 LBS | WEIGHT: 158 LBS | HEIGHT: 66 IN | SYSTOLIC BLOOD PRESSURE: 107 MMHG | SYSTOLIC BLOOD PRESSURE: 107 MMHG | SYSTOLIC BLOOD PRESSURE: 107 MMHG | SYSTOLIC BLOOD PRESSURE: 107 MMHG

## 2024-11-06 DIAGNOSIS — Z91.018 ALLERGY TO OTHER FOODS: ICD-10-CM

## 2024-11-06 DIAGNOSIS — R14.0 ABDOMINAL DISTENSION (GASEOUS): ICD-10-CM

## 2024-11-06 DIAGNOSIS — R19.4 CHANGE IN BOWEL HABIT: ICD-10-CM

## 2024-11-06 DIAGNOSIS — R15.0 INCOMPLETE DEFECATION: ICD-10-CM

## 2024-11-06 DIAGNOSIS — K20.0 EOSINOPHILIC ESOPHAGITIS: ICD-10-CM

## 2024-11-06 PROCEDURE — 99213 OFFICE O/P EST LOW 20 MIN: CPT | Performed by: NURSE PRACTITIONER

## 2025-02-12 ENCOUNTER — APPOINTMENT (OUTPATIENT)
Dept: GENERAL RADIOLOGY | Facility: HOSPITAL | Age: 55
End: 2025-02-12
Payer: MEDICARE

## 2025-02-12 ENCOUNTER — HOSPITAL ENCOUNTER (OUTPATIENT)
Facility: HOSPITAL | Age: 55
Discharge: HOME | End: 2025-02-12
Attending: EMERGENCY MEDICINE | Admitting: EMERGENCY MEDICINE
Payer: MEDICARE

## 2025-02-12 VITALS
RESPIRATION RATE: 18 BRPM | WEIGHT: 159 LBS | HEART RATE: 88 BPM | SYSTOLIC BLOOD PRESSURE: 150 MMHG | DIASTOLIC BLOOD PRESSURE: 93 MMHG | BODY MASS INDEX: 25.55 KG/M2 | OXYGEN SATURATION: 99 % | TEMPERATURE: 97.9 F | HEIGHT: 66 IN

## 2025-02-12 DIAGNOSIS — M79.674 PAIN IN RIGHT TOE(S): ICD-10-CM

## 2025-02-12 DIAGNOSIS — S62.663A CLOSED NONDISPLACED FRACTURE OF DISTAL PHALANX OF LEFT MIDDLE FINGER, INITIAL ENCOUNTER: ICD-10-CM

## 2025-02-12 DIAGNOSIS — M79.645 FINGER PAIN, LEFT: Primary | ICD-10-CM

## 2025-02-12 PROCEDURE — 73630 X-RAY EXAM OF FOOT: CPT

## 2025-02-12 PROCEDURE — G0463 HOSPITAL OUTPT CLINIC VISIT: HCPCS | Performed by: NURSE PRACTITIONER

## 2025-02-12 PROCEDURE — 73130 X-RAY EXAM OF HAND: CPT
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
## 2025-02-13 NOTE — DISCHARGE INSTRUCTIONS
Wear finger splint at home as directed. Apply ice every 2 hours while awake, on for 20 minutes at a time. Perform exercises per hand out. Make sure you are drinking at least 80 ounces of water daily. Continue home medication regimen. Schedule follow up with primary care for recheck. Go to ED for any new or worsening symptoms.

## 2025-04-07 ENCOUNTER — OFFICE (AMBULATORY)
Dept: URBAN - METROPOLITAN AREA CLINIC 64 | Facility: CLINIC | Age: 55
End: 2025-04-07

## 2025-04-07 VITALS
SYSTOLIC BLOOD PRESSURE: 110 MMHG | HEART RATE: 86 BPM | HEIGHT: 66 IN | WEIGHT: 153 LBS | DIASTOLIC BLOOD PRESSURE: 76 MMHG

## 2025-04-07 DIAGNOSIS — K20.0 EOSINOPHILIC ESOPHAGITIS: ICD-10-CM

## 2025-04-07 DIAGNOSIS — M62.89 OTHER SPECIFIED DISORDERS OF MUSCLE: ICD-10-CM

## 2025-04-07 DIAGNOSIS — R13.10 DYSPHAGIA, UNSPECIFIED: ICD-10-CM

## 2025-04-07 PROCEDURE — 99214 OFFICE O/P EST MOD 30 MIN: CPT | Performed by: INTERNAL MEDICINE
